# Patient Record
Sex: MALE | Race: WHITE | NOT HISPANIC OR LATINO | Employment: FULL TIME | URBAN - METROPOLITAN AREA
[De-identification: names, ages, dates, MRNs, and addresses within clinical notes are randomized per-mention and may not be internally consistent; named-entity substitution may affect disease eponyms.]

---
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
## 2020-07-13 ENCOUNTER — PATIENT MESSAGE (OUTPATIENT)
Dept: MEDICAL GROUP | Facility: PHYSICIAN GROUP | Age: 20
End: 2020-07-13

## 2020-07-13 ENCOUNTER — HOSPITAL ENCOUNTER (OUTPATIENT)
Dept: LAB | Facility: MEDICAL CENTER | Age: 20
End: 2020-07-13
Attending: FAMILY MEDICINE
Payer: COMMERCIAL

## 2020-07-13 DIAGNOSIS — Z01.84 IMMUNITY STATUS TESTING: ICD-10-CM

## 2020-07-13 LAB — SARS-COV-2 AB SERPL QL IA: NORMAL

## 2020-07-13 PROCEDURE — 86769 SARS-COV-2 COVID-19 ANTIBODY: CPT

## 2020-07-13 PROCEDURE — 36415 COLL VENOUS BLD VENIPUNCTURE: CPT

## 2020-07-21 DIAGNOSIS — F34.1 DYSTHYMIA: ICD-10-CM

## 2020-08-24 ENCOUNTER — TELEMEDICINE (OUTPATIENT)
Dept: BEHAVIORAL HEALTH | Facility: CLINIC | Age: 20
End: 2020-08-24
Payer: COMMERCIAL

## 2020-08-24 DIAGNOSIS — F43.23 ADJUSTMENT DISORDER WITH MIXED ANXIETY AND DEPRESSED MOOD: ICD-10-CM

## 2020-08-24 PROCEDURE — 90791 PSYCH DIAGNOSTIC EVALUATION: CPT | Performed by: MARRIAGE & FAMILY THERAPIST
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
## 2020-09-15 ENCOUNTER — TELEMEDICINE (OUTPATIENT)
Dept: BEHAVIORAL HEALTH | Facility: CLINIC | Age: 20
End: 2020-09-15
Payer: COMMERCIAL

## 2020-09-15 DIAGNOSIS — E66.9 OBESITY (BMI 35.0-39.9 WITHOUT COMORBIDITY): ICD-10-CM

## 2020-09-15 DIAGNOSIS — F43.23 ADJUSTMENT DISORDER WITH MIXED ANXIETY AND DEPRESSED MOOD: ICD-10-CM

## 2020-09-15 PROCEDURE — 90834 PSYTX W PT 45 MINUTES: CPT | Mod: 95,CR | Performed by: MARRIAGE & FAMILY THERAPIST

## 2020-09-15 ASSESSMENT — ANXIETY QUESTIONNAIRES
4. TROUBLE RELAXING: MORE THAN HALF THE DAYS
6. BECOMING EASILY ANNOYED OR IRRITABLE: MORE THAN HALF THE DAYS
7. FEELING AFRAID AS IF SOMETHING AWFUL MIGHT HAPPEN: SEVERAL DAYS
1. FEELING NERVOUS, ANXIOUS, OR ON EDGE: SEVERAL DAYS
3. WORRYING TOO MUCH ABOUT DIFFERENT THINGS: NOT AT ALL
5. BEING SO RESTLESS THAT IT IS HARD TO SIT STILL: MORE THAN HALF THE DAYS
2. NOT BEING ABLE TO STOP OR CONTROL WORRYING: NOT AT ALL
GAD7 TOTAL SCORE: 8

## 2020-09-15 ASSESSMENT — PATIENT HEALTH QUESTIONNAIRE - PHQ9
CLINICAL INTERPRETATION OF PHQ2 SCORE: 3
5. POOR APPETITE OR OVEREATING: 0 - NOT AT ALL
SUM OF ALL RESPONSES TO PHQ QUESTIONS 1-9: 11

## 2020-11-16 ENCOUNTER — TELEMEDICINE (OUTPATIENT)
Dept: MEDICAL GROUP | Facility: PHYSICIAN GROUP | Age: 20
End: 2020-11-16
Payer: COMMERCIAL

## 2020-11-16 DIAGNOSIS — U07.1 CLINICAL DIAGNOSIS OF COVID-19: ICD-10-CM

## 2020-11-16 PROCEDURE — 99214 OFFICE O/P EST MOD 30 MIN: CPT | Mod: 95,CR | Performed by: NURSE PRACTITIONER

## 2020-11-16 RX ORDER — ALBUTEROL SULFATE 90 UG/1
2 AEROSOL, METERED RESPIRATORY (INHALATION) EVERY 4 HOURS PRN
Qty: 1 EACH | Refills: 0 | Status: SHIPPED | OUTPATIENT
Start: 2020-11-16 | End: 2022-06-15

## 2020-11-16 RX ORDER — METHYLPREDNISOLONE 4 MG/1
TABLET ORAL
Qty: 21 TAB | Refills: 0 | Status: SHIPPED | OUTPATIENT
Start: 2020-11-16 | End: 2022-06-15

## 2020-11-16 NOTE — ASSESSMENT & PLAN NOTE
Robin reports symptoms started 10/31, states that he woke up with a pounding headache and a fever of 102.  Last fever was on 11/6.  Dates that 11/3 he started with a cough and severe shortness of breath states that he continues to have difficulty breathing but did not feel that his O2 saturation was low states he was worried he was going to have to go to the hospital but used herbal remedies to keep his lungs feeling open as well as steam.  States that he was able to stay home.  States that he continued to have some difficulty breathing but that overall it improved.  Despite significant exposure patient did have a negative Covid test.  As such he return to work 11/13.  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  The shortness of breath involved pain with inhalation and exhalation states that it is sharp and felt like a weight on his chest that it does not happen except when he is breathing in and out.  Has not noticed any chest pain, numbness or down his arm.  Nuys any epigastric discomfort.  States that after he went home he started feeling better states that a couple days after that he attempted to go jogging states that any exertion causes pain in his lungs and shortness of breath states that it will resolve if he sits and rests for approximately an hour.  States that he will feel like he is wheezing.  Patient is concerned considering his Covid test was negative but that he is having significant symptoms that remain.  Patient does have a history of pneumonia, denies any other symptoms of reactive airway disease or asthma in the past.

## 2020-11-17 NOTE — PROGRESS NOTES
Virtual Visit: Established Patient   This visit was conducted via Zoom using secure and encrypted videoconferencing technology. The patient was in a private location in the state of [place].    The patient's identity was confirmed and verbal consent was obtained for this virtual visit.    Subjective:   CC:   Chief Complaint   Patient presents with   • Letter for School/Work   • Coronavirus Screening       Robin Houser is a 20 y.o. male presenting for evaluation and management of:    Clinical diagnosis of COVID-19  Robin reports symptoms started 10/31, states that he woke up with a pounding headache and a fever of 102.  Last fever was on 11/6.  Dates that 11/3 he started with a cough and severe shortness of breath states that he continues to have difficulty breathing but did not feel that his O2 saturation was low states he was worried he was going to have to go to the hospital but used herbal remedies to keep his lungs feeling open as well as steam.  States that he was able to stay home.  States that he continued to have some difficulty breathing but that overall it improved.  Despite significant exposure patient did have a negative Covid test.  As such he return to work 11/13.  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  The shortness of breath involved pain with inhalation and exhalation states that it is sharp and felt like a weight on his chest that it does not happen except when he is breathing in and out.  Has not noticed any chest pain, numbness or down his arm.  Nuys any epigastric discomfort.  States that after he went home he started feeling better states that a couple days after that he attempted to go jogging states that any exertion causes pain in his lungs and shortness of breath states that it will resolve if he sits and rests for approximately an hour.  States that he will feel like he  is wheezing.  Patient is concerned considering his Covid test was negative but that he is having significant symptoms that remain.  Patient does have a history of pneumonia, denies any other symptoms of reactive airway disease or asthma in the past.        ROS Denies any recent fevers or chills. No nausea or vomiting. No chest pains or shortness of breath.     No Known Allergies    Current medicines (including changes today)  Current Outpatient Medications   Medication Sig Dispense Refill   • methylPREDNISolone (MEDROL DOSEPAK) 4 MG Tablet Therapy Pack As directed on the packaging label. 21 Tab 0   • albuterol 108 (90 Base) MCG/ACT Aero Soln inhalation aerosol Inhale 2 Puffs every four hours as needed for Shortness of Breath. 1 Each 0   • ibuprofen (MOTRIN) 200 MG Tab Take 200 mg by mouth every 6 hours as needed.       No current facility-administered medications for this visit.        Patient Active Problem List    Diagnosis Date Noted   • Clinical diagnosis of COVID-19 11/16/2020   • Adjustment disorder with mixed anxiety and depressed mood 08/24/2020   • Obesity (BMI 35.0-39.9 without comorbidity) 09/12/2017   • Fever 09/12/2017   • Tonsillitis 09/12/2017   • Pneumonia of both lower lobes due to infectious organism 06/19/2017   • Seasonal allergic rhinitis due to pollen 04/04/2017       Family History   Problem Relation Age of Onset   • Hypertension Father    • Lung Disease Father         asthma   • Hyperlipidemia Mother    • Diabetes Neg Hx    • Genetic Disorder Neg Hx    • Genitourinary () Problems Neg Hx    • Heart Disease Neg Hx    • Non-contributory Neg Hx        He  has a past medical history of Allergy. He also has no past medical history of ASTHMA.  He  has no past surgical history on file.       Objective:   There were no vitals taken for this visit.    Physical Exam:  Constitutional: Alert, no distress, well-groomed.  Skin: No rashes in visible areas.  Eye: Round. Conjunctiva clear, lids normal. No  icterus.   ENMT: Lips pink without lesions, good dentition, moist mucous membranes. Phonation normal.  Neck: No masses, no thyromegaly. Moves freely without pain.  Respiratory: Unlabored respiratory effort, no cough or audible wheeze  Psych: Alert and oriented x3, normal affect and mood.       Assessment and Plan:   The following treatment plan was discussed:     1. Clinical diagnosis of COVID-19  - methylPREDNISolone (MEDROL DOSEPAK) 4 MG Tablet Therapy Pack; As directed on the packaging label.  Dispense: 21 Tab; Refill: 0  - albuterol 108 (90 Base) MCG/ACT Aero Soln inhalation aerosol; Inhale 2 Puffs every four hours as needed for Shortness of Breath.  Dispense: 1 Each; Refill: 0    With regard to significant remaining shortness of breath, tightness in his chest plan to treat with steroid and albuterol inhaler.  Discussed with patient possible need for chest x-ray if symptoms do not improve with treatment patient may also need a pulmonology follow-up for diagnosis of potential asthma/reactive airway disease.  Some concern related to history of pneumonia.    Follow-up: Return in about 2 weeks (around 11/30/2020), or if symptoms worsen or fail to improve.

## 2020-11-23 ENCOUNTER — PATIENT MESSAGE (OUTPATIENT)
Dept: MEDICAL GROUP | Facility: PHYSICIAN GROUP | Age: 20
End: 2020-11-23

## 2020-11-23 DIAGNOSIS — R06.02 SOB (SHORTNESS OF BREATH): ICD-10-CM

## 2020-12-16 ENCOUNTER — HOSPITAL ENCOUNTER (OUTPATIENT)
Dept: RADIOLOGY | Facility: MEDICAL CENTER | Age: 20
End: 2020-12-16
Attending: INTERNAL MEDICINE
Payer: COMMERCIAL

## 2020-12-16 ENCOUNTER — OFFICE VISIT (OUTPATIENT)
Dept: SLEEP MEDICINE | Facility: MEDICAL CENTER | Age: 20
End: 2020-12-16
Payer: COMMERCIAL

## 2020-12-16 VITALS
OXYGEN SATURATION: 97 % | HEART RATE: 82 BPM | WEIGHT: 278 LBS | SYSTOLIC BLOOD PRESSURE: 116 MMHG | HEIGHT: 71 IN | TEMPERATURE: 98.6 F | DIASTOLIC BLOOD PRESSURE: 76 MMHG | RESPIRATION RATE: 18 BRPM | BODY MASS INDEX: 38.92 KG/M2

## 2020-12-16 DIAGNOSIS — U07.1 CLINICAL DIAGNOSIS OF COVID-19: ICD-10-CM

## 2020-12-16 DIAGNOSIS — R06.02 SOB (SHORTNESS OF BREATH): ICD-10-CM

## 2020-12-16 PROCEDURE — 71046 X-RAY EXAM CHEST 2 VIEWS: CPT

## 2020-12-16 PROCEDURE — 99204 OFFICE O/P NEW MOD 45 MIN: CPT | Performed by: INTERNAL MEDICINE

## 2020-12-16 ASSESSMENT — ENCOUNTER SYMPTOMS
DIAPHORESIS: 0
SPEECH CHANGE: 0
HEARTBURN: 0
CONSTIPATION: 0
PALPITATIONS: 0
ABDOMINAL PAIN: 0
DIZZINESS: 0
VOMITING: 0
COUGH: 0
FOCAL WEAKNESS: 0
DIARRHEA: 0
DEPRESSION: 0
EYE DISCHARGE: 0
PHOTOPHOBIA: 0
FALLS: 0
WHEEZING: 0
EYE REDNESS: 0
HEMOPTYSIS: 0
CHILLS: 0
SHORTNESS OF BREATH: 1
CLAUDICATION: 0
NAUSEA: 0
EYE PAIN: 0
MYALGIAS: 0
HEADACHES: 0
BLURRED VISION: 0
WEAKNESS: 0
PND: 0
SORE THROAT: 0
ORTHOPNEA: 0
SPUTUM PRODUCTION: 0
FEVER: 0
STRIDOR: 0
SINUS PAIN: 0
BACK PAIN: 0
WEIGHT LOSS: 0
DOUBLE VISION: 0
TREMORS: 0
NECK PAIN: 0

## 2020-12-16 NOTE — PROGRESS NOTES
Chief Complaint   Patient presents with   • Establish Care     referral 11/16/2020 apryl Barnard DX SOB    • Results     CXR 6/17/17        HPI: This patient is a 20 y.o. male presenting for evaluation of SOB and chest pain.  The patient's past medical history is significant only for seasonal allergies for which she takes as needed, over-the-counter antihistamine.  No surgical history.  He is a lifelong non-smoker.  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  Family history is notable for father with asthma and multiple family members with environmental allergies.  The patient presents today for evaluation of chest tightness that persisted after a respiratory tract infection presumed secondary to novel coronavirus COVID-19 although he tested negative on November 6 with [facility].  Patient reports fairly severe respiratory symptoms dating back to January of this year when he experienced fevers, body aches, shortness of breath, cough, diarrhea and associated nausea and vomiting.  Acute symptoms lasted for roughly 10 days and overall he improved in a period of roughly 3 weeks.  In July he had coronavirus antibody testing done which was negative.  On October 31 he had sudden onset severe headache with fever to 102.  This was associated with diarrhea, chest pressure and later dry cough that was less severe than his infection in January.  As per above he was tested for coronavirus on November 6 which was negative.  Acute symptoms lasted for roughly 10 days but overall he continued to improve over the next 2 weeks.  Since that time he has continued to have chest tightness particularly when lying flat at night or with activity during the day.  He was treated by his primary care with Medrol Dosepak and albuterol inhaler.  He did not notice any difference while on steroids but does notice some improvement in symptoms with albuterol.  He denies wheezing or cough.  No recurrent fevers.  Last  chest x-ray is from June 2017 which showed some bilateral interstitial changes that were mild.  Patient was reportedly symptomatic and treated for pneumonia at that time.    Past Medical History:   Diagnosis Date   • Allergy     dust, perfume, pollen       Social History     Socioeconomic History   • Marital status: Single     Spouse name: Not on file   • Number of children: Not on file   • Years of education: Not on file   • Highest education level: Not on file   Occupational History   • Not on file   Social Needs   • Financial resource strain: Not on file   • Food insecurity     Worry: Not on file     Inability: Not on file   • Transportation needs     Medical: Not on file     Non-medical: Not on file   Tobacco Use   • Smoking status: Never Smoker   • Smokeless tobacco: Never Used   Substance and Sexual Activity   • Alcohol use: No   • Drug use: No   • Sexual activity: Never     Comment: 7th grade - [employer]   Lifestyle   • Physical activity     Days per week: Not on file     Minutes per session: Not on file   • Stress: Not on file   Relationships   • Social connections     Talks on phone: Not on file     Gets together: Not on file     Attends [employer] service: Not on file     Active member of club or organization: Not on file     Attends meetings of clubs or organizations: Not on file     Relationship status: Not on file   • Intimate partner violence     Fear of current or ex partner: Not on file     Emotionally abused: Not on file     Physically abused: Not on file     Forced sexual activity: Not on file   Other Topics Concern   • Behavioral problems No   • Interpersonal relationships Yes   • Sad or not enjoying activities No   • Suicidal thoughts No   • Poor school performance Not Asked   • Reading difficulties No   • Speech difficulties No   • Writing difficulties No   • Inadequate sleep No   • Excessive TV viewing No   • Excessive video game use Yes   • Inadequate exercise Yes   • Sports related No   •  Poor diet No   • Family concerns for drug/alcohol abuse No   • Poor oral hygiene No   • Bike safety Yes   • Family concerns vehicle safety No   Social History Narrative   • Not on file       Family History   Problem Relation Age of Onset   • Hypertension Father    • Lung Disease Father         asthma   • Hyperlipidemia Mother    • Diabetes Neg Hx    • Genetic Disorder Neg Hx    • Genitourinary () Problems Neg Hx    • Heart Disease Neg Hx    • Non-contributory Neg Hx        Current Outpatient Medications on File Prior to Visit   Medication Sig Dispense Refill   • albuterol 108 (90 Base) MCG/ACT Aero Soln inhalation aerosol Inhale 2 Puffs every four hours as needed for Shortness of Breath. 1 Each 0   • methylPREDNISolone (MEDROL DOSEPAK) 4 MG Tablet Therapy Pack As directed on the packaging label. (Patient not taking: Reported on 12/16/2020) 21 Tab 0   • ibuprofen (MOTRIN) 200 MG Tab Take 200 mg by mouth every 6 hours as needed.       No current facility-administered medications on file prior to visit.        Allergies: Patient has no known allergies.    ROS:   Review of Systems   Constitutional: Negative for chills, diaphoresis, fever, malaise/fatigue and weight loss.   HENT: Negative for congestion, ear discharge, ear pain, hearing loss, nosebleeds, sinus pain, sore throat and tinnitus.    Eyes: Negative for blurred vision, double vision, photophobia, pain, discharge and redness.   Respiratory: Positive for shortness of breath. Negative for cough, hemoptysis, sputum production, wheezing and stridor.    Cardiovascular: Positive for chest pain. Negative for palpitations, orthopnea, claudication, leg swelling and PND.   Gastrointestinal: Negative for abdominal pain, constipation, diarrhea, heartburn, nausea and vomiting.   Genitourinary: Negative for dysuria and urgency.   Musculoskeletal: Negative for back pain, falls, joint pain, myalgias and neck pain.   Skin: Negative for itching and rash.   Neurological:  "Negative for dizziness, tremors, speech change, focal weakness, weakness and headaches.   Endo/Heme/Allergies: Negative for environmental allergies.   Psychiatric/Behavioral: Negative for depression.       /76 (BP Location: Right arm, Patient Position: Sitting, BP Cuff Size: Large adult)   Pulse 82   Temp 37 °C (98.6 °F) (Temporal)   Resp 18   Ht 1.803 m (5' 11\")   Wt (!) 126.1 kg (278 lb)   SpO2 97%     Physical Exam:  Physical Exam  Vitals signs reviewed.   Constitutional:       General: He is not in acute distress.     Appearance: Normal appearance. He is obese.   HENT:      Head: Normocephalic and atraumatic.      Right Ear: External ear normal.      Left Ear: External ear normal.      Nose: Nose normal. No congestion.      Mouth/Throat:      Mouth: Mucous membranes are moist.      Pharynx: Oropharynx is clear. No oropharyngeal exudate.   Eyes:      General: No scleral icterus.     Extraocular Movements: Extraocular movements intact.      Conjunctiva/sclera: Conjunctivae normal.      Pupils: Pupils are equal, round, and reactive to light.   Neck:      Musculoskeletal: Normal range of motion and neck supple.   Cardiovascular:      Rate and Rhythm: Normal rate and regular rhythm.      Heart sounds: Normal heart sounds. No murmur. No gallop.    Pulmonary:      Effort: Pulmonary effort is normal. No respiratory distress.      Breath sounds: Normal breath sounds. No wheezing or rales.   Abdominal:      General: Abdomen is flat. There is no distension.      Palpations: Abdomen is soft.   Musculoskeletal: Normal range of motion.      Right lower leg: No edema.      Left lower leg: No edema.   Skin:     General: Skin is warm and dry.      Findings: No rash.   Neurological:      Mental Status: He is alert and oriented to person, place, and time.      Cranial Nerves: No cranial nerve deficit.   Psychiatric:         Mood and Affect: Mood normal.         Behavior: Behavior normal.         PFTs as reviewed by me " personally:pending    Imaging as reviewed by me personally:pending    Assessment:  1. SOB (shortness of breath)  PULMONARY FUNCTION TESTS -Test requested: Complete Pulmonary Function Test    DX-CHEST-2 VIEWS   2. Clinical diagnosis of COVID-19         Plan:  1.  This is a persistent symptom following respiratory tract infection presumed secondary to coronavirus although patient tested negative, this could be false negative as he did have close contact to others who did test positive prior to symptom onset.  We discussed the possibility of postinfectious reactive airways disease given chest tightness and improvement in symptoms following rescue bronchodilator.  I am recommending we proceed with a trial of inhaled corticosteroid and long-acting beta agonist and he was given Dulera 100 in clinic today.  He will contact me if no improvement in symptoms.  If symptoms do improve we can determine whether or not to continue or see how he does after 2 weeks.  We will continue short acting bronchodilators as needed and he will be provided a spacer and instructed on use.  We will also obtain chest x-ray and pulmonary function test.  2.  This was clinically suspected but not confirmed based on testing.  Certainly timing and presentation are consistent with what we would expect in Covid positive patient.  For now we will just treat him as though he did test positive.  As per above biggest concern is postinfectious reactive airways disease.  If he does not improve with treatment for this and no abnormalities on PFTs we may need to proceed with echocardiogram.  Return in about 2 months (around 2/16/2021), or 2-3 months, for pfts, cxr.       No

## 2021-01-13 ENCOUNTER — NON-PROVIDER VISIT (OUTPATIENT)
Dept: SLEEP MEDICINE | Facility: MEDICAL CENTER | Age: 21
End: 2021-01-13
Attending: INTERNAL MEDICINE
Payer: COMMERCIAL

## 2021-01-13 VITALS — BODY MASS INDEX: 38.08 KG/M2 | WEIGHT: 266 LBS | HEIGHT: 70 IN

## 2021-01-13 DIAGNOSIS — R06.02 SOB (SHORTNESS OF BREATH): ICD-10-CM

## 2021-01-13 PROCEDURE — 94729 DIFFUSING CAPACITY: CPT | Performed by: INTERNAL MEDICINE

## 2021-01-13 PROCEDURE — 94726 PLETHYSMOGRAPHY LUNG VOLUMES: CPT | Performed by: INTERNAL MEDICINE

## 2021-01-13 PROCEDURE — 94060 EVALUATION OF WHEEZING: CPT | Performed by: INTERNAL MEDICINE

## 2021-01-13 ASSESSMENT — PULMONARY FUNCTION TESTS
FEV1_LLN: 3.90
FEV1_PERCENT_PREDICTED: 103
FEV1_PERCENT_CHANGE: 0
FEV1/FVC: 93
FVC_PERCENT_PREDICTED: 89
FEV1_PERCENT_CHANGE: 5
FEV1/FVC_PERCENT_PREDICTED: 113
FVC_PERCENT_PREDICTED: 90
FEV1/FVC_PERCENT_CHANGE: 4
FEV1/FVC: 96.79
FEV1/FVC: 97
FEV1/FVC: 93
FVC_PREDICTED: 5.52
FVC: 4.98
FEV1/FVC_PERCENT_PREDICTED: 85
FEV1/FVC_PERCENT_PREDICTED: 114
FEV1/FVC_PREDICTED: 85
FVC_LLN: 4.61
FEV1_PERCENT_PREDICTED: 97
FEV1: 4.82
FEV1_PREDICTED: 4.67
FEV1/FVC_PERCENT_LLN: 71
FEV1/FVC_PERCENT_PREDICTED: 109
FVC: 4.94
FEV1/FVC_PERCENT_PREDICTED: 108
FEV1: 4.57

## 2021-01-14 NOTE — PROCEDURES
[facility] [place]  PROGRESS NOTE  Chief Complaint:   Patient presents with:  ER F/U: mild dehydration follow up      HPI:   This is a 46year old male presents for follow-up from ER after patient was diagnosed with dehydration.   Patient's fluid Tech: Georgina Sosa, RRT, CPFT  Tech notes: Good patient effort & cooperation.  During inhale maneuver feel tightness in chest with dull pain.  The results of this test meet the ATS/ERS standards for acceptability & reproducibility.  Test was performed on the [employer] Body Plethysmograph-Elite DX system.  Predicted values were GLI-2012 for spirometry, GLI- 2017 for DLCO, ITS for Lung Volumes.  The DLCO was uncorrected for Hgb.  A bronchodilator of Ventolin HFA -2puffs via spacer administered.  DLCO performed during dilation period.    Interpretation:  1.  Baseline spirometry shows mild airflow restriction with reduced FVC compared to FEV1 with ratio of 93 however FVC is within normal limits at 4.94 L or 89% predicted.   2.  There is significant bronchodilator response.  3.  Total lung capacity is within normal limits at 6.47 L or 94% predicted.  Significant air trapping.  4.  Diffusion capacity is elevated at 134% predicted.  Pulmonary function testing is overall normal however there is reduced FVC when compared to FEV1 suggesting mild restrictive process which may be related to body habitus.  Bronchodilator responsiveness may be consistent with reactive airways disease.  Suggest clinical correlation.  2.   Hypertension Brother      Allergies:    Amlodipine                  Comment:Other reaction(s): ankle swelling  Ace Inhibitors          Coughing    Comment:cough  Current Meds:    Current Outpatient Prescriptions:  Metoprolol Tartrate 50 MG Oral Tab Take 1 Answered         REVIEW OF SYSTEMS:   CONSTITUTIONAL:  Denies unusual weight gain/loss, fever, chills, or fatigue.    EYES: no visual complaints or deficits  HEENT: denies nasal congestion, sinus pain or sore throat; hearing loss negative  INTEGUMENTARY:  D alert and oriented x 3; affect appropriate          ASSESSMENT AND PLAN:   Mendoza Merritt was seen today for er f/u. Diagnoses and all orders for this visit:    Dehydration-improved      Patient Instructions   Recommend plenty of fluids.    Dehydration improved

## 2021-02-17 ENCOUNTER — APPOINTMENT (OUTPATIENT)
Dept: SLEEP MEDICINE | Facility: MEDICAL CENTER | Age: 21
End: 2021-02-17
Payer: COMMERCIAL

## 2021-03-25 ENCOUNTER — TELEMEDICINE (OUTPATIENT)
Dept: SLEEP MEDICINE | Facility: MEDICAL CENTER | Age: 21
End: 2021-03-25
Payer: COMMERCIAL

## 2021-03-25 DIAGNOSIS — R06.02 SOB (SHORTNESS OF BREATH): ICD-10-CM

## 2021-03-25 DIAGNOSIS — U07.1 CLINICAL DIAGNOSIS OF COVID-19: ICD-10-CM

## 2021-03-25 PROCEDURE — 99213 OFFICE O/P EST LOW 20 MIN: CPT | Mod: 95,CR | Performed by: INTERNAL MEDICINE

## 2021-03-25 ASSESSMENT — ENCOUNTER SYMPTOMS
PND: 0
COUGH: 0
FALLS: 0
HEARTBURN: 0
PHOTOPHOBIA: 0
CHILLS: 0
WEAKNESS: 0
WHEEZING: 0
DIAPHORESIS: 0
SORE THROAT: 0
FEVER: 0
MYALGIAS: 0
CLAUDICATION: 0
SHORTNESS OF BREATH: 0
TREMORS: 0
NECK PAIN: 0
SINUS PAIN: 0
FOCAL WEAKNESS: 0
SPUTUM PRODUCTION: 0
STRIDOR: 0
DIARRHEA: 0
DIZZINESS: 0
NAUSEA: 0
EYE DISCHARGE: 0
VOMITING: 0
SPEECH CHANGE: 0
WEIGHT LOSS: 0
EYE PAIN: 0
DOUBLE VISION: 0
DEPRESSION: 0
ABDOMINAL PAIN: 0
EYE REDNESS: 0
BLURRED VISION: 0
BACK PAIN: 0
HEADACHES: 0
HEMOPTYSIS: 0
CONSTIPATION: 0
PALPITATIONS: 0
ORTHOPNEA: 0

## 2021-03-25 NOTE — PROGRESS NOTES
Chief Complaint   Patient presents with   • Shortness of Breath     last seen 12/16/2020     This evaluation was conducted via Zoom using secure and encrypted videoconferencing technology. The patient was in a private location in the state [facility].    The patient's identity was confirmed and verbal consent was obtained for this virtual visit.    HPI: This patient is a 20 y.o. male whom is followed in our clinic for shortness of breath last seen by me on 12/16/2020.The patient's past medical history is significant only for seasonal allergies for which he takes as needed, over-the-counter antihistamine.  He is a lifelong non-smoker.  The patient presented to me in December for chest tightness and shortness of breath with cough that persisted following a presumptive diagnosis of coronavirus 19 November.  He actually tested negative but had a known exposure and acute symptoms included fever, body ache, shortness of breath, cough, diarrhea with associated nausea and vomiting.  Acute symptoms resolved after about 10 days to 2 weeks but he continued to have chest tightness particularly lying flat or with activity during the day.  He was given an albuterol inhaler and Medrol Dosepak by primary care provider with temporary relief.  Ordered chest x-ray which showed clear lung fields and pulmonary function testing which showed no airflow obstruction but did show bronchodilator responsiveness and air trapping, normal total lung capacity and elevated DLCO all suggestive of reactive airways disease.  We gave him a 2-week trial of Dulera 100 which improved his symptoms immediately and he had no need for rescue inhaler while on this therapy.  He spaced the Dulera out taking only 1 puff twice daily and has officially been off now for 6 days with no worsening of symptoms.    Past Medical History:   Diagnosis Date   • Allergy     dust, perfume, pollen       Social History     Socioeconomic History   • Marital status: Single      Spouse name: Not on file   • Number of children: Not on file   • Years of education: Not on file   • Highest education level: Not on file   Occupational History   • Not on file   Tobacco Use   • Smoking status: Never Smoker   • Smokeless tobacco: Never Used   Substance and Sexual Activity   • Alcohol use: No   • Drug use: No   • Sexual activity: Never     Comment: 7th grade - [employer]   Other Topics Concern   • Behavioral problems No   • Interpersonal relationships Yes   • Sad or not enjoying activities No   • Suicidal thoughts No   • Poor school performance Not Asked   • Reading difficulties No   • Speech difficulties No   • Writing difficulties No   • Inadequate sleep No   • Excessive TV viewing No   • Excessive video game use Yes   • Inadequate exercise Yes   • Sports related No   • Poor diet No   • Family concerns for drug/alcohol abuse No   • Poor oral hygiene No   • Bike safety Yes   • Family concerns vehicle safety No   Social History Narrative   • Not on file     Social Determinants of Health     Financial Resource Strain:    • Difficulty of Paying Living Expenses:    Food Insecurity:    • Worried About Running Out of Food in the Last Year:    • Ran Out of Food in the Last Year:    Transportation Needs:    • Lack of Transportation (Medical):    • Lack of Transportation (Non-Medical):    Physical Activity:    • Days of Exercise per Week:    • Minutes of Exercise per Session:    Stress:    • Feeling of Stress :    Social Connections:    • Frequency of Communication with Friends and Family:    • Frequency of Social Gatherings with Friends and Family:    • Attends [employer] Services:    • Active Member of Clubs or Organizations:    • Attends Club or Organization Meetings:    • Marital Status:    Intimate Partner Violence:    • Fear of Current or Ex-Partner:    • Emotionally Abused:    • Physically Abused:    • Sexually Abused:        Family History   Problem Relation Age of Onset   • Hypertension Father    •  Lung Disease Father         asthma   • Hyperlipidemia Mother    • Diabetes Neg Hx    • Genetic Disorder Neg Hx    • Genitourinary () Problems Neg Hx    • Heart Disease Neg Hx    • Non-contributory Neg Hx        Current Outpatient Medications on File Prior to Visit   Medication Sig Dispense Refill   • Mometasone Furo-Formoterol Fum (DULERA) 100-5 MCG/ACT Aerosol Inhale 2 Puffs 2 Times a Day. Use spacer. Rinse mouth after use. 1 Each 0   • albuterol 108 (90 Base) MCG/ACT Aero Soln inhalation aerosol Inhale 2 Puffs every four hours as needed for Shortness of Breath. 1 Each 0   • ibuprofen (MOTRIN) 200 MG Tab Take 200 mg by mouth every 6 hours as needed.     • methylPREDNISolone (MEDROL DOSEPAK) 4 MG Tablet Therapy Pack As directed on the packaging label. (Patient not taking: Reported on 12/16/2020) 21 Tab 0     No current facility-administered medications on file prior to visit.       Patient has no known allergies.      ROS:   Review of Systems   Constitutional: Negative for chills, diaphoresis, fever, malaise/fatigue and weight loss.   HENT: Negative for congestion, ear discharge, ear pain, hearing loss, nosebleeds, sinus pain, sore throat and tinnitus.    Eyes: Negative for blurred vision, double vision, photophobia, pain, discharge and redness.   Respiratory: Negative for cough, hemoptysis, sputum production, shortness of breath, wheezing and stridor.    Cardiovascular: Negative for chest pain, palpitations, orthopnea, claudication, leg swelling and PND.   Gastrointestinal: Negative for abdominal pain, constipation, diarrhea, heartburn, nausea and vomiting.   Genitourinary: Negative for dysuria and urgency.   Musculoskeletal: Negative for back pain, falls, joint pain, myalgias and neck pain.   Skin: Negative for itching and rash.   Neurological: Negative for dizziness, tremors, speech change, focal weakness, weakness and headaches.   Endo/Heme/Allergies: Negative for environmental allergies.    Psychiatric/Behavioral: Negative for depression.       There were no vitals taken for this visit.  Physical Exam  Constitutional:       General: He is not in acute distress.     Appearance: Normal appearance. He is normal weight.   HENT:      Head: Normocephalic and atraumatic.      Right Ear: External ear normal.      Left Ear: External ear normal.      Nose: Nose normal. No congestion.      Mouth/Throat:      Mouth: Mucous membranes are moist.      Pharynx: Oropharynx is clear. No oropharyngeal exudate.   Eyes:      General: No scleral icterus.     Extraocular Movements: Extraocular movements intact.      Conjunctiva/sclera: Conjunctivae normal.      Pupils: Pupils are equal, round, and reactive to light.   Neurological:      Mental Status: He is alert and oriented to person, place, and time.   Psychiatric:         Mood and Affect: Mood normal.         Behavior: Behavior normal.         PFTs as reviewed by me personally: as per hPI    Imaging as reviewed by me personally:  As per hPI    Assessment:  1. SOB (shortness of breath)     2. Clinical diagnosis of COVID-19         Plan:  1.  This was presumed secondary to postinfectious reactive airways disease for which we treated the patient with inhaled corticosteroids at low-dose with complete resolution of symptoms.  Given we suspect his symptoms are temporary, we discussed either continuing Dulera or trying a trial off therapy to see if symptoms worsen.  He is currently been on for 6 days with no recurrence or worsening of symptoms.  Patient will continue to use albuterol as needed and if symptoms worsen we can resume Dulera.  At this time he will follow-up as needed pending response to being off therapy.  2.  Clinically presumed although now definitive diagnosis.  Continue observation and best supportive care.  Return if symptoms worsen or fail to improve.

## 2021-04-16 ENCOUNTER — NON-PROVIDER VISIT (OUTPATIENT)
Dept: URGENT CARE | Facility: CLINIC | Age: 21
End: 2021-04-16

## 2021-04-16 DIAGNOSIS — Z02.1 PRE-EMPLOYMENT DRUG SCREENING: ICD-10-CM

## 2021-04-16 LAB
AMP AMPHETAMINE: NORMAL
COC COCAINE: NORMAL
INT CON NEG: NORMAL
INT CON POS: NORMAL
MET METHAMPHETAMINES: NORMAL
OPI OPIATES: NORMAL
PCP PHENCYCLIDINE: NORMAL
POC DRUG COMMENT 753798-OCCUPATIONAL HEALTH: NORMAL
THC: NORMAL

## 2021-04-16 PROCEDURE — 80305 DRUG TEST PRSMV DIR OPT OBS: CPT | Performed by: PHYSICIAN ASSISTANT

## 2021-05-11 ENCOUNTER — OCCUPATIONAL MEDICINE (OUTPATIENT)
Dept: URGENT CARE | Facility: PHYSICIAN GROUP | Age: 21
End: 2021-05-11
Payer: COMMERCIAL

## 2021-05-11 ENCOUNTER — HOSPITAL ENCOUNTER (OUTPATIENT)
Dept: RADIOLOGY | Facility: MEDICAL CENTER | Age: 21
End: 2021-05-11
Attending: PHYSICIAN ASSISTANT
Payer: COMMERCIAL

## 2021-05-11 VITALS
WEIGHT: 270 LBS | BODY MASS INDEX: 38.65 KG/M2 | HEART RATE: 85 BPM | OXYGEN SATURATION: 98 % | HEIGHT: 70 IN | TEMPERATURE: 98 F | SYSTOLIC BLOOD PRESSURE: 138 MMHG | DIASTOLIC BLOOD PRESSURE: 94 MMHG | RESPIRATION RATE: 15 BRPM

## 2021-05-11 DIAGNOSIS — S60.221A CONTUSION OF RIGHT HAND, INITIAL ENCOUNTER: ICD-10-CM

## 2021-05-11 DIAGNOSIS — S62.396A CLOSED DISPLACED FRACTURE OF OTHER PART OF FIFTH METACARPAL BONE OF RIGHT HAND, INITIAL ENCOUNTER: ICD-10-CM

## 2021-05-11 DIAGNOSIS — Z02.1 PRE-EMPLOYMENT DRUG SCREENING: ICD-10-CM

## 2021-05-11 PROCEDURE — 73130 X-RAY EXAM OF HAND: CPT | Mod: RT

## 2021-05-11 PROCEDURE — 99214 OFFICE O/P EST MOD 30 MIN: CPT | Mod: 29 | Performed by: PHYSICIAN ASSISTANT

## 2021-05-11 PROCEDURE — 80305 DRUG TEST PRSMV DIR OPT OBS: CPT | Mod: 29 | Performed by: PHYSICIAN ASSISTANT

## 2021-05-11 ASSESSMENT — PAIN SCALES - GENERAL: PAINLEVEL: 7=MODERATE-SEVERE PAIN

## 2021-05-11 ASSESSMENT — ENCOUNTER SYMPTOMS
MYALGIAS: 1
TINGLING: 0
WEAKNESS: 0

## 2021-05-11 NOTE — LETTER
[facility] [place]  [address] [facility] [place] [address]  Phone:  [phone] - Fax:  [phone]   [facility] Progress Report and Disability Certification  Date of Service: 5/11/2021   No Show:  No  Date / Time of Next Visit: 5/17/2021   Claim Information   Patient Name: Robin Houser  Claim Number:     Employer: [employer]  Date of Injury: 5/11/2021     Insurer / TPA: [employer]  ID / SSN:     Occupation:   Diagnosis: Diagnoses of Contusion of right hand, initial encounter and Closed displaced fracture of other part of fifth metacarpal bone of right hand, initial encounter were pertinent to this visit.    Medical Information   Related to Industrial Injury? Yes    Subjective Complaints:  HPI:  DOI: 5/11/21  ERASTO:  This is a very pleasant 20-year-old male presenting to the clinic after sustaining a work-related injury.  The patient works at [employer].  Today he was moving a battery weighing approximately 40 pounds.  He was lifting a battery with his left hand with his right hand on the table.  The battery slipped out of his left hand landing on his right hand.  He experienced immediate pain and swelling predominantly located over the right hand fourth and fifth MCP joints.  The patient is right-handed.  He denies any previous injury to this hand.  Pain is described as a constant dull ache.  He has not tried any OTC medications at this time for symptoms.  The pain does not radiate.  He denies any numbness or tingling distally.  Patient does not have a second job.    PMH:   No pertinent past medical history to this problem  MEDS:  Medications were reviewed in EMR  ALLERGIES:  Allergies were reviewed in EMR  SOCHX:  Works at [employer]  FH:   No pertinent family history to this problem     Objective Findings: Constitutional: Pt is oriented to person, place, and time.  Appears well-developed and well-nourished. No distress.   Eyes: Conjunctivae  are normal.   Cardiovascular: Normal rate.    Pulmonary/Chest: Effort normal.   Musculoskeletal: Right hand: There is obvious swelling appreciated over the right hand greatest in the area of the fourth and fifth MCP joint.  She does have swelling extending to the fourth and fifth metacarpals.  Range of motion is limited to full flexion of the MCP joints due to pain.  Full and pain-free wrist range of motion.  Sensation is intact to all fingers distally.  He is tender to palpation over the fourth and fifth MCP joints and metacarpals.  Neurological: Pt is alert and oriented to person, place, and time. Coordination normal.   Skin: Skin is warm. Pt is not diaphoretic. No erythema.   Psychiatric: Pt has a normal mood and affect.  Behavior is normal.      Pre-Existing Condition(s):     Assessment:   Initial Visit    Status: Discharged / Care Transfer  Permanent Disability:No    Plan: Transfer Care  Comments:Patient has transfer to occupational medicine and sports medicine.    Diagnostics: X-ray    Comments:  FINDINGS:  There is an acute minimally anteriorly angulated fracture of the distal metaphysis of the right 5th metacarpal.  No articular communication is identified.  There is overlying soft tissue swelling.     IMPRESSION:     1.  Acute minimally an  teriorly angulated fracture of the distal metaphysis of the right 5th metacarpal.     2.  No articular communication identified.    Disability Information   Status: Released to Restricted Duty    From:  5/11/2021  Through: 5/17/2021 Restrictions are: Temporary   Physical Restrictions   Sitting:    Standing:    Stooping:    Bending:      Squatting:    Walking:    Climbing:    Pushing:      Pulling:    Other:    Reaching Above Shoulder (L):   Reaching Above Shoulder (R):       Reaching Below Shoulder (L):    Reaching Below Shoulder (R):      Not to exceed Weight Limits   Carrying(hrs):   Weight Limit(lb):   Lifting(hrs):   Weight  Limit(lb):     Comments: Patient is to  continue wearing splint on right hand until next follow-up visit.  No use of right hand while at work.  Ice and elevation recommended.  Alternate Tylenol and ibuprofen if needed for pain.  A referral was placed for the patient to follow-up with sports medicine and occupational medicine.  If possible I would like follow-up in the next 7 days.  Patient does have a minimally angulated fracture of the right fifth metacarpal.  Was placed in an ulnar gutter splint.  Encouraged to call if he has any questions or concerns return to clinic for any worsening symptoms.    Repetitive Actions   Hands: i.e. Fine Manipulations from Grasping:     Feet: i.e. Operating Foot Controls:     Driving / Operate Machinery:     Provider Name:   Michael Walton P.A.-C. Physician Signature:  Physician Name:     Clinic Name / Location: [address] [address] Clinic Phone Number: Dept: [phone]   Appointment Time: 10:20 Am Visit Start Time: 11:41 AM   Check-In Time:  10:51 Am Visit Discharge Time:  12:30 PM   Original-Treating Physician or Chiropractor    Page 2-Insurer/TPA    Page 3-Employer    Page 4-Employee

## 2021-05-11 NOTE — LETTER
"EMPLOYEE’S CLAIM FOR COMPENSATION/ REPORT OF INITIAL TREATMENT  FORM C-4    EMPLOYEE’S CLAIM - PROVIDE ALL INFORMATION REQUESTED   First Name  Robin Last Name  Mik Birthdate                    2000                Sex  male Claim Number   Home Address  [address] Age  20 y.o. Height  1.778 m (5' 10\") Weight  122 kg (270 lb) [facility]     [facility] Zip  [address] Telephone  [phone] (home)    Mailing Address  [address] [facility] Zip  [address] Primary Language Spoken  English    Insurer   Third Party   [employer]   Employee's Occupation (Job Title) When Injury or Occupational Disease Occurred      Employer's Name  [employer]  Telephone  [phone]    Employer Address  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 (Be specific an answer in detail. Use additional sheet if necessary)  Battery slipped from left hand landing and crushing right hand pinky and knucle   If you believe that you have an occupational disease, when did you first have knowledge of the disability and it relationship to your employment?  N/A Witnesses to the Accident  None      Nature of Injury or Occupational Disease  Workers' Compensation  Part(s) of Body Injured or Affected  Finger (R), Finger (R), Finger (R)    I certify that the above is true and correct to the best of my knowledge and that I have provided this information in order to obtain " the benefits of [place]  I hereby authorize any physician, chiropractor, surgeon, practitioner, or other person, any hospital, including [facility] hospital, any medical service organization, any insurance company, or other institution or organization to release to each other, any medical or other information, including benefits paid or payable, pertinent to this injury or disease, except information relative to diagnosis, treatment and/or counseling for AIDS, psychological conditions, alcohol or controlled substances, for which I must give specific authorization.  A Photostat of this authorization shall be as valid as the original.     Date   Place   Employee’s Signature   THIS REPORT MUST BE COMPLETED AND MAILED WITHIN 3 WORKING DAYS OF TREATMENT   Place  [facility]  Name of Facility  [place]   Date  5/11/2021 Diagnosis  (S60.221A) Contusion of right hand, initial encounter  (S62.396A) Closed displaced fracture of other part of fifth metacarpal bone of right hand, initial encounter Is there evidence the injured employee was under the              influence of alcohol and/or another controlled substance at the time of accident?   Hour  11:41 AM Description of Injury or Disease  Diagnoses of Contusion of right hand, initial encounter and Closed displaced fracture of other part of fifth metacarpal bone of right hand, initial encounter were pertinent to this visit. No   Treatment  Patient is to continue wearing splint on right hand until next follow-up visit.  No use of right hand while at work.  Ice and elevation recommended.  Alternate Tylenol and ibuprofen if needed for pain.  A referral was placed for the patient to follow-up with sports medicine and occupational medicine.  If possible I would like follow-up in the next 7 days.  Patient does have a minimally angulated  fracture of the right fifth metacarpal.  Was placed in an ulnar gutter splint.  Encouraged to call if he has any questions or concerns return to clinic for any worsening symptoms.  Have you advised the patient to remain off work five days or     more? No   X-Ray Findings  Positive  Comments:Fracture of fifth metacarpal minimally angulated   If Yes   From Date  To Date      From information given by the employee, together with medical evidence, can you directly connect this injury or occupational disease as job incurred?  Yes If No Full Duty    No Modified Duty  Yes   Is additional medical care by a physician indicated?  Yes  Comments:Referral placed to follow-up with sports medicine and occupational medicine If Modified Duty, Specify any Limitations / Restrictions  See D-39   Do you know of any previous injury or disease contributing to this condition or occupational disease?                            No   Date  5/11/2021 Print Doctor’s Name   Juanpablo Walton P.A.-C. I certify the employer’s copy of  this form was mailed on:   Address  [address] [place] [address]. Insurer’s Use Only     [facility]  [address]    Provider’s Tax ID Number  [id number] Telephone  Dept: [phone]      JUANPABLO Vernon P.A.-C.  Signature:     Degree          ORIGINAL-TREATING PHYSICIAN OR CHIROPRACTOR    PAGE 2-INSURER/TPA    PAGE 3-EMPLOYER    PAGE 4-EMPLOYEE        Form C-4 (rev.10/07)           BRIEF DESCRIPTION OF RIGHTS AND BENEFITS  (Pursuant to [place] 616C.050)    Notice of Injury or Occupational Disease (Incident Report Form C-1): If an injury or occupational disease (OD) arises out of and in the course of employment, you must provide written notice to your employer as soon as practicable, but no later than 7 days after the accident or OD. Your employer shall maintain a sufficient supply of the required forms.    Claim for Compensation (Form C-4): If medical treatment is sought, the form C-4 is available at the place of  "initial treatment. A completed \"Claim for Compensation\" (Form C-4) must be filed within 90 days after an accident or OD. The treating physician or chiropractor must, within 3 working days after treatment, complete and mail to the employer, the employer's insurer and third-party , the Claim for Compensation.    Medical Treatment: If you require medical treatment for your on-the-job injury or OD, you may be required to select a physician or chiropractor from a list provided by your workers’ compensation insurer, if it has contracted with an Organization for Managed Care (MCO) or Preferred Provider Organization (PPO) or providers of health care. If your employer has not entered into a contract with an MCO or PPO, you may select a physician or chiropractor from the Panel of Physicians and Chiropractors. Any medical costs related to your industrial injury or OD will be paid by your insurer.    Temporary Total Disability (TTD): If your doctor has certified that you are unable to work for a period of at least 5 consecutive days, or 5 cumulative days in a 20-day period, or places restrictions on you that your employer does not accommodate, you may be entitled to TTD compensation.    Temporary Partial Disability (TPD): If the wage you receive upon reemployment is less than the compensation for TTD to which you are entitled, the insurer may be required to pay you TPD compensation to make up the difference. TPD can only be paid for a maximum of 24 months.    Permanent Partial Disability (PPD): When your medical condition is stable and there is an indication of a PPD as a result of your injury or OD, within 30 days, your insurer must arrange for an evaluation by a rating physician or chiropractor to determine the degree of your PPD. The amount of your PPD award depends on the date of injury, the results of the PPD evaluation, your age and wage.    Permanent Total Disability (PTD): If you are medically certified by " a treating physician or chiropractor as permanently and totally disabled and have been granted a PTD status by your insurer, you are entitled to receive monthly benefits not to exceed 66 2/3% of your average monthly wage. The amount of your PTD payments is subject to reduction if you previously received a lump-sum PPD award.    Vocational Rehabilitation Services: You may be eligible for vocational rehabilitation services if you are unable to return to the job due to a permanent physical impairment or permanent restrictions as a result of your injury or occupational disease.    Transportation and Per Alicja Reimbursement: You may be eligible for travel expenses and per alicja associated with medical treatment.    Reopening: You may be able to reopen your claim if your condition worsens after claim closure.     Appeal Process: If you disagree with a written determination issued by the insurer or the insurer does not respond to your request, you may appeal to the Department of Administration, , by following the instructions contained in your determination letter. You must appeal the determination within 70 days from the date of the determination letter at [address], [address], [place] [address], or [address], [place] [address]. If you disagree with the  decision, you may appeal to the Department of Administration, . You must file your appeal within 30 days from the date of the  decision letter at [address], [address], [place] [address], or [address], [place] [address]. If you disagree with a decision of an , you may file a petition for judicial review with the District Court. You must do so within 30 days of the Appeal Officer’s decision. You may be represented by an  at your own expense or you may contact the [facility] for possible  representation.    [place]  for Injured Workers (NAIW): If you disagree with a  decision, you may request that NAIW represent you without charge at an  Hearing. For information regarding denial of benefits, you may contact the NAIW at: [address], [address], [place] [address], [phone], or [address], [address], [place] [address], [phone]    To File a Complaint with the Division: If you wish to file a complaint with the  of the Division of Industrial Relations (DIR),  please contact the Workers’ Compensation Section, [address], [address], [place], [place] [address], telephone ([phone]) [phone], or [address], [address], [place], [place] [address], telephone [phone].    For assistance with Workers’ Compensation Issues: You may contact the [facility] Office for Consumer Health Assistance, [address], [address], [place], [place] [address], Toll Free 1-539.547.4459, Web site: http://[facility].[place].gov/Programs/DANIELLE E-mail: [email or web address].[facility]              __________________________________________________________________                                    _________________            Employee Name / Signature                                                                                                                            Date                                                                                                                                                                                                                              D-2 (rev. 10/20)

## 2021-05-11 NOTE — PROGRESS NOTES
"Subjective:      Robin Houser is a 20 y.o. male who presents with Hand Injury (right hand/pinky crush injury from a battery)      HPI:  DOI: 5/11/21  ERASTO:  This is a very pleasant 20-year-old male presenting to the clinic after sustaining a work-related injury.  The patient works at [employer].  Today he was moving a battery weighing approximately 40 pounds.  He was lifting a battery with his left hand with his right hand on the table.  The battery slipped out of his left hand landing on his right hand.  He experienced immediate pain and swelling predominantly located over the right hand fourth and fifth MCP joints.  The patient is right-handed.  He denies any previous injury to this hand.  Pain is described as a constant dull ache.  He has not tried any OTC medications at this time for symptoms.  The pain does not radiate.  He denies any numbness or tingling distally.  Patient does not have a second job.    PMH:   No pertinent past medical history to this problem  MEDS:  Medications were reviewed in EMR  ALLERGIES:  Allergies were reviewed in EMR  SOCHX:  Works at [employer]  FH:   No pertinent family history to this problem       Review of Systems   Musculoskeletal: Positive for joint pain and myalgias.   Neurological: Negative for tingling and weakness.          Objective:     /94   Pulse 85   Temp 36.7 °C (98 °F)   Resp 15   Ht 1.778 m (5' 10\")   Wt 122 kg (270 lb)   SpO2 98%   BMI 38.74 kg/m²      Physical Exam    Constitutional: Pt is oriented to person, place, and time.  Appears well-developed and well-nourished. No distress.   Eyes: Conjunctivae are normal.   Cardiovascular: Normal rate.    Pulmonary/Chest: Effort normal.   Musculoskeletal: Right hand: There is obvious swelling appreciated over the right hand greatest in the area of the fourth and fifth MCP joint.  She does have swelling extending to the fourth and fifth metacarpals.  Range of motion is limited to full flexion of the " MCP joints due to pain.  Full and pain-free wrist range of motion.  Sensation is intact to all fingers distally.  He is tender to palpation over the fourth and fifth MCP joints and metacarpals.  Neurological: Pt is alert and oriented to person, place, and time. Coordination normal.   Skin: Skin is warm. Pt is not diaphoretic. No erythema.   Psychiatric: Pt has a normal mood and affect.  Behavior is normal.     Right hand x-ray:     FINDINGS:  There is an acute minimally anteriorly angulated fracture of the distal metaphysis of the right 5th metacarpal.  No articular communication is identified.  There is overlying soft tissue swelling.     IMPRESSION:     1.  Acute minimally anteriorly angulated fracture of the distal metaphysis of the right 5th metacarpal.     2.  No articular communication identified.     Assessment/Plan:        1. Contusion of right hand, initial encounter    - DX-HAND 3+ RIGHT; Future    2. Closed displaced fracture of other part of fifth metacarpal bone of right hand, initial encounter    - REFERRAL TO SPORTS MEDICINE  - REFERRAL TO OCCUPATIONAL MEDICINE    Patient is to continue wearing splint on right hand until next follow-up visit.  No use of right hand while at work.  Ice and elevation recommended.  Alternate Tylenol and ibuprofen if needed for pain.  A referral was placed for the patient to follow-up with sports medicine and occupational medicine.  If possible I would like follow-up in the next 7 days.  Patient does have a minimally angulated fracture of the right fifth metacarpal.  Was placed in an ulnar gutter splint.  Encouraged to call if he has any questions or concerns return to clinic for any worsening symptoms.

## 2021-05-17 ENCOUNTER — OCCUPATIONAL MEDICINE (OUTPATIENT)
Dept: URGENT CARE | Facility: PHYSICIAN GROUP | Age: 21
End: 2021-05-17
Payer: COMMERCIAL

## 2021-05-17 VITALS
SYSTOLIC BLOOD PRESSURE: 120 MMHG | OXYGEN SATURATION: 100 % | HEIGHT: 70 IN | WEIGHT: 270 LBS | BODY MASS INDEX: 38.65 KG/M2 | TEMPERATURE: 97.8 F | HEART RATE: 91 BPM | RESPIRATION RATE: 16 BRPM | DIASTOLIC BLOOD PRESSURE: 76 MMHG
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
## 2021-05-17 DIAGNOSIS — S62.396A CLOSED DISPLACED FRACTURE OF OTHER PART OF FIFTH METACARPAL BONE OF RIGHT HAND, INITIAL ENCOUNTER: ICD-10-CM

## 2021-05-17 PROCEDURE — 99213 OFFICE O/P EST LOW 20 MIN: CPT | Performed by: NURSE PRACTITIONER

## 2021-05-17 ASSESSMENT — ENCOUNTER SYMPTOMS
FOCAL WEAKNESS: 1
SENSORY CHANGE: 0
FEVER: 0
NAUSEA: 0
VOMITING: 0
TINGLING: 0
CHILLS: 0

## 2021-05-17 NOTE — LETTER
[facility]  [address]  [place] [address]  Phone:  [phone] - Fax:  [phone]   [facility] Progress Report and Disability Certification  Date of Service: 5/17/2021   No Show:  No  Date / Time of Next Visit: 5/24/2021 [facility] 8:00AM   Claim Information   Patient Name: Robin Houser  Claim Number:     Employer: [employer]  Date of Injury: 5/11/2021     Insurer / TPA: [employer]  ID / SSN:     Occupation:   Diagnosis: Diagnoses of Closed displaced fracture of other part of fifth metacarpal bone of right hand, initial encounter and Work related injury were pertinent to this visit.    Medical Information   Related to Industrial Injury? Yes    Subjective Complaints:  Pt presents for evaluation of an existing work comp injury.   DOI: 5/11/21  ERASTO:  This is a very pleasant 20-year-old male presenting to the clinic after sustaining a work-related injury.  The patient works at [employer].  Today he was moving a battery weighing approximately 40 pounds.  He was lifting a battery with his left hand with his right hand on the table.  The battery slipped out of his left hand landing on his right hand.  He experienced immediate pain and swelling predominantly located over the right hand fourth and fifth MCP joints.  The patient is right-handed.  He denies any previous injury to this hand.  Pain is described as a constant dull ache.  He has not tried any OTC medications at this time for symptoms.  The pain does not radiate.  He denies any numbness or tingling distally.  Patient does not have a second job.  Update: 5/17/2021. Robin states minimal pain to his right hand and uses Ibuprofen when needed. His pain is well controlled with NSAIDS, ice and elevation. He is adhering to set work restrictions and states this is tolerable. Splint remains in place. He has not yet heard from occupational med or sports med. Negative for numbness or  tingling.        Objective Findings: Musculoskeletal:      Left hand: Bony tenderness present. Decreased range of motion. Decreased strength. Normal sensation.      Cervical back: Normal range of motion and neck supple.      Comments: Ecchymosis and swelling present of fourth and fifth metacarpals.  Tenderness to palpation.        Pre-Existing Condition(s): none   Assessment:   Condition Same    Status: Additional Care Required  Permanent Disability:No    Plan: Transfer Care    Diagnostics:      Comments:       Disability Information   Status: Released to Restricted Duty    From:  5/17/2021  Through: 5/24/2021 Restrictions are: Temporary   Physical Restrictions   Sitting:    Standing:    Stooping:    Bending:      Squatting:    Walking:    Climbing:    Pushing:      Pulling:    Other:    Reaching Above Shoulder (L):   Reaching Above Shoulder (R):       Reaching Below Shoulder (L):    Reaching Below Shoulder (R):      Not to exceed Weight Limits   Carrying(hrs):   Weight Limit(lb):   Lifting(hrs):   Weight  Limit(lb):     Comments: Continue with set work restrictions (no use of right hand), continue wearing splint at all times, continue with ice, rest, elevation and NSAIDs/Tylenol as needed for relief of pain.  Patient to follow-up with occupational medicine tomorrow.   AVS handout given and reviewed with patient. Pt educated on red flags and when to seek treatment back in ER or UC.     Repetitive Actions   Hands: i.e. Fine Manipulations from Grasping:     Feet: i.e. Operating Foot Controls:     Driving / Operate Machinery:     Provider Name:   AURELIO Peoples Physician Signature:  Physician Name:     Clinic Name / Location: [address] [address] Clinic Phone Number: Dept: [phone]   Appointment Time: 8:00 Am Visit Start Time: 8:05 AM   Check-In Time:  7:34 Am Visit Discharge Time:  8:55 AM   Original-Treating Physician or Chiropractor    Page 2-Insurer/TPA     Page 3-Employer    Page 4-Employee

## 2021-05-17 NOTE — PROGRESS NOTES
Subjective:     Robin Houser is a 20 y.o. male who presents for Work-Related Injury (FV/ DOI 5-11-21/ R pinky injury )      HPI  Pt presents for evaluation of an existing work comp injury.   DOI: 5/11/21  ERASTO:  This is a very pleasant 20-year-old male presenting to the clinic after sustaining a work-related injury.  The patient works at [employer].  Today he was moving a battery weighing approximately 40 pounds.  He was lifting a battery with his left hand with his right hand on the table.  The battery slipped out of his left hand landing on his right hand.  He experienced immediate pain and swelling predominantly located over the right hand fourth and fifth MCP joints.  The patient is right-handed.  He denies any previous injury to this hand.  Pain is described as a constant dull ache.  He has not tried any OTC medications at this time for symptoms.  The pain does not radiate.  He denies any numbness or tingling distally.  Patient does not have a second job.  Update: 5/17/2021. Robin states minimal pain to his right hand and uses Ibuprofen when needed. His pain is well controlled with NSAIDS, ice and elevation. He is adhering to set work restrictions and states this is tolerable. Splint remains in place. He has not yet heard from occupational med or sports med. Negative for numbness or tingling.     Review of Systems   Constitutional: Negative for chills and fever.   Gastrointestinal: Negative for nausea and vomiting.   Musculoskeletal: Positive for joint pain.   Neurological: Positive for focal weakness. Negative for tingling and sensory change.       PMH:   Past Medical History:   Diagnosis Date   • Allergy     dust, perfume, pollen     ALLERGIES: No Known Allergies  SURGHX: No past surgical history on file.  SOCHX:   Social History     Socioeconomic History   • Marital status: Single     Spouse name: Not on file   • Number of children: Not on file   • Years of education: Not on file   • Highest education  level: Not on file   Occupational History   • Not on file   Tobacco Use   • Smoking status: Never Smoker   • Smokeless tobacco: Never Used   Vaping Use   • Vaping Use: Never used   Substance and Sexual Activity   • Alcohol use: No   • Drug use: No   • Sexual activity: Never     Comment: 7th grade - [employer]   Other Topics Concern   • Behavioral problems No   • Interpersonal relationships Yes   • Sad or not enjoying activities No   • Suicidal thoughts No   • Poor school performance Not Asked   • Reading difficulties No   • Speech difficulties No   • Writing difficulties No   • Inadequate sleep No   • Excessive TV viewing No   • Excessive video game use Yes   • Inadequate exercise Yes   • Sports related No   • Poor diet No   • Family concerns for drug/alcohol abuse No   • Poor oral hygiene No   • Bike safety Yes   • Family concerns vehicle safety No   Social History Narrative   • Not on file     Social Determinants of Health     Financial Resource Strain:    • Difficulty of Paying Living Expenses:    Food Insecurity:    • Worried About Running Out of Food in the Last Year:    • Ran Out of Food in the Last Year:    Transportation Needs:    • Lack of Transportation (Medical):    • Lack of Transportation (Non-Medical):    Physical Activity:    • Days of Exercise per Week:    • Minutes of Exercise per Session:    Stress:    • Feeling of Stress :    Social Connections:    • Frequency of Communication with Friends and Family:    • Frequency of Social Gatherings with Friends and Family:    • Attends Mosque Services:    • Active Member of Clubs or Organizations:    • Attends Club or Organization Meetings:    • Marital Status:    Intimate Partner Violence:    • Fear of Current or Ex-Partner:    • Emotionally Abused:    • Physically Abused:    • Sexually Abused:      FH:   Family History   Problem Relation Age of Onset   • Hypertension Father    • Lung Disease Father         asthma   • Hyperlipidemia Mother    • Diabetes  "Neg Hx    • Genetic Disorder Neg Hx    • Genitourinary () Problems Neg Hx    • Heart Disease Neg Hx    • Non-contributory Neg Hx          Objective:   /76   Pulse 91   Temp 36.6 °C (97.8 °F) (Temporal)   Resp 16   Ht 1.778 m (5' 10\")   Wt 122 kg (270 lb)   SpO2 100%   BMI 38.74 kg/m²     Physical Exam  Vitals and nursing note reviewed.   Constitutional:       General: He is not in acute distress.     Appearance: Normal appearance. He is not ill-appearing.   HENT:      Head: Normocephalic and atraumatic.      Right Ear: External ear normal.      Left Ear: External ear normal.      Nose: No congestion or rhinorrhea.      Mouth/Throat:      Mouth: Mucous membranes are moist.   Eyes:      Extraocular Movements: Extraocular movements intact.      Pupils: Pupils are equal, round, and reactive to light.   Cardiovascular:      Rate and Rhythm: Normal rate and regular rhythm.      Pulses: Normal pulses.      Heart sounds: Normal heart sounds.   Pulmonary:      Effort: Pulmonary effort is normal.      Breath sounds: Normal breath sounds.   Abdominal:      General: Abdomen is flat. Bowel sounds are normal.      Palpations: Abdomen is soft.      Tenderness: There is no abdominal tenderness. There is no right CVA tenderness or left CVA tenderness.   Musculoskeletal:      Left hand: Bony tenderness present. Decreased range of motion. Decreased strength. Normal sensation.      Cervical back: Normal range of motion and neck supple.      Comments: Ecchymosis and swelling present of fourth and fifth metacarpals.  Tenderness to palpation.   Skin:     General: Skin is warm and dry.      Capillary Refill: Capillary refill takes less than 2 seconds.   Neurological:      General: No focal deficit present.      Mental Status: He is alert and oriented to person, place, and time. Mental status is at baseline.   Psychiatric:         Mood and Affect: Mood normal.         Behavior: Behavior normal.         Thought Content: " Thought content normal.         Judgment: Judgment normal.         Assessment/Plan:   Assessment    1. Closed displaced fracture of other part of fifth metacarpal bone of right hand, initial encounter     2. Work related injury       Continue with set work restrictions (no use of right hand), continue wearing splint at all times, continue with ice, rest, elevation and NSAIDs/Tylenol as needed for relief of pain.  Patient to follow-up with occupational medicine tomorrow.   AVS handout given and reviewed with patient. Pt educated on red flags and when to seek treatment back in ER or UC.

## 2021-05-17 NOTE — LETTER
[facility]  [address] [place] [address]  Phone:  [phone] - Fax:  [phone]   [facility] Progress Report and Disability Certification  Date of Service: 5/17/2021   No Show:  No  Date / Time of Next Visit: 5/24/2021   Claim Information   Patient Name: Robin Houser  Claim Number:     Employer: [employer]  Date of Injury: 5/11/2021     Insurer / TPA: [employer]  ID / SSN:     Occupation:   Diagnosis: Diagnoses of Closed displaced fracture of other part of fifth metacarpal bone of right hand, initial encounter and Work related injury were pertinent to this visit.    Medical Information   Related to Industrial Injury? Yes    Subjective Complaints:  Pt presents for evaluation of an existing work comp injury.   DOI: 5/11/21  ERASTO:  This is a very pleasant 20-year-old male presenting to the clinic after sustaining a work-related injury.  The patient works at [employer].  Today he was moving a battery weighing approximately 40 pounds.  He was lifting a battery with his left hand with his right hand on the table.  The battery slipped out of his left hand landing on his right hand.  He experienced immediate pain and swelling predominantly located over the right hand fourth and fifth MCP joints.  The patient is right-handed.  He denies any previous injury to this hand.  Pain is described as a constant dull ache.  He has not tried any OTC medications at this time for symptoms.  The pain does not radiate.  He denies any numbness or tingling distally.  Patient does not have a second job.  Update: 5/17/2021. Robin states minimal pain to his right hand and uses Ibuprofen when needed. His pain is well controlled with NSAIDS, ice and elevation. He is adhering to set work restrictions and states this is tolerable. Splint remains in place. He has not yet heard from occupational med or sports med. Negative for numbness or tingling.         Objective Findings: Musculoskeletal:      Left hand: Bony tenderness present. Decreased range of motion. Decreased strength. Normal sensation.      Cervical back: Normal range of motion and neck supple.      Comments: Ecchymosis and swelling present of fourth and fifth metacarpals.  Tenderness to palpation.        Pre-Existing Condition(s): none   Assessment:   Condition Same    Status: Additional Care Required  Permanent Disability:No    Plan: Transfer Care    Diagnostics:      Comments:       Disability Information   Status: Released to Restricted Duty    From:  5/17/2021  Through: 5/24/2021 Restrictions are: Temporary   Physical Restrictions   Sitting:    Standing:    Stooping:    Bending:      Squatting:    Walking:    Climbing:    Pushing:      Pulling:    Other:    Reaching Above Shoulder (L):   Reaching Above Shoulder (R):       Reaching Below Shoulder (L):    Reaching Below Shoulder (R):      Not to exceed Weight Limits   Carrying(hrs):   Weight Limit(lb):   Lifting(hrs):   Weight  Limit(lb):     Comments: Continue with set work restrictions (no use of right hand), continue wearing splint at all times, continue with ice, rest, elevation and NSAIDs/Tylenol as needed for relief of pain.  Patient to follow-up with occupational medicine tomorrow.   AVS handout given and reviewed with patient. Pt educated on red flags and when to seek treatment back in ER or UC.     Repetitive Actions   Hands: i.e. Fine Manipulations from Grasping:     Feet: i.e. Operating Foot Controls:     Driving / Operate Machinery:     Provider Name:   AURELIO Peoples Physician Signature:  Physician Name:     Clinic Name / Location: [address] [address] Clinic Phone Number: Dept: [phone]   Appointment Time: 8:00 Am Visit Start Time: 8:05 AM   Check-In Time:  7:34 Am Visit Discharge Time:  8:57 AM   Original-Treating Physician or Chiropractor    Page 2-Insurer/TPA    Page 3-Employer     Page 4-Employee

## 2021-05-19 ENCOUNTER — OCCUPATIONAL MEDICINE (OUTPATIENT)
Dept: OCCUPATIONAL MEDICINE | Facility: CLINIC | Age: 21
End: 2021-05-19
Payer: COMMERCIAL

## 2021-05-19 VITALS
HEIGHT: 70 IN | DIASTOLIC BLOOD PRESSURE: 80 MMHG | OXYGEN SATURATION: 98 % | HEART RATE: 83 BPM | SYSTOLIC BLOOD PRESSURE: 130 MMHG | WEIGHT: 283 LBS | TEMPERATURE: 98.8 F | BODY MASS INDEX: 40.52 KG/M2 | RESPIRATION RATE: 16 BRPM

## 2021-05-19 DIAGNOSIS — S62.396D CLOSED DISPLACED FRACTURE OF OTHER PART OF FIFTH METACARPAL BONE OF RIGHT HAND WITH ROUTINE HEALING, SUBSEQUENT ENCOUNTER: ICD-10-CM

## 2021-05-19 PROCEDURE — 99202 OFFICE O/P NEW SF 15 MIN: CPT | Performed by: PREVENTIVE MEDICINE

## 2021-05-19 NOTE — PROGRESS NOTES
"Subjective:     Robin Houser is a 20 y.o. male who presents for Follow-Up (WC DOI: 5/11/21 right hand; Rm 17)      DOI: 5/11/2021:  20-year-old male presents for injury to the right hand. ERASTO: He was moving a battery weighing approximately 40 pounds.  He was lifting a battery with his left hand with his right hand on the table when the battery slipped out of his left hand landing on his right hand.  He experienced immediate pain and swelling predominantly located over the right hand fourth and fifth MCP joints. He was seen in the [facility], XR showed \"Acute minimally anteriorly angulated fracture of the distal metaphysis of the right 5th metacarpal.\"  Patient states overall pain is improved and significantly he states that he has not really required any ibuprofen for the last few days.  Patient denies any radiating pain, numbness or tingling.  Pain is centered at the shaft of the fifth metacarpal bone.  He states swelling has reduced.  He states he has taken it out of the Ortho-Glass splint to shower and a few times to let it breathe.  Has not heard about any sports medicine referral being approved.    ROS: All systems were reviewed on intake form, form was reviewed and signed. See scanned documents in media. Pertinent positives and negatives included in HPI.    PMH: No pertinent past medical history to this problem  MEDS: Medications were reviewed in Epic  ALLERGIES: No Known Allergies  SOCHX: Works as a  at [employer]  FH: No pertinent family history to this problem       Objective:     /80   Pulse 83   Temp 37.1 °C (98.8 °F)   Resp 16   Ht 1.778 m (5' 10\")   Wt (!) 128 kg (283 lb)   SpO2 98%   BMI 40.61 kg/m²     Constitutional: Patient is in no acute distress. Appears well-developed and well-nourished.   HENT: Normocephalic and atraumatic. EOM are normal. No scleral icterus.   Cardiovascular: Normal rate.    Pulmonary/Chest: Effort normal. No respiratory distress.   Neurological: " Patient is alert and oriented to person, place, and time.   Skin: Skin is warm and dry.   Psychiatric: Normal mood and affect. Behavior is normal.     Right hand: Ortho-Glass splint in place.  Area of tenderness indicated over the MCP and distal fifth metacarpal bone.  No visible swelling.  Distal neurovascular intact.    Assessment/Plan:       1. Closed displaced fracture of other part of fifth metacarpal bone of right hand with routine healing, subsequent encounter  - REFERRAL TO ORTHOPEDICS    Released to Restricted Duty FROM 5/19/2021 TO 5/26/2021  No use of right hand  Referral to orthopedics pending  Continue in Ortho-Glass splint  Continue OTC ibuprofen as needed  Restricted duty  Follow-up 1 week, if not seen by orthopedics will repeat x-ray at that time    Differential diagnosis, natural history, supportive care, and indications for immediate follow-up discussed.    Approximately 25 minutes were spent in reviewing notes, preparing for visit, obtaining history, exam and evaluation, patient counseling/education and post visit documentation/orders.

## 2021-05-19 NOTE — LETTER
"   [address],   [address] [place] [address]  Phone:  [phone] - Fax:  [phone]   [facility] Progress Report and Disability Certification  Date of Service: 5/19/2021   No Show:  No  Date / Time of Next Visit: 5/26/2021 8:15 AM   Claim Information   Patient Name: Robin Houser  Claim Number:     Employer: [employer]  Date of Injury: 5/11/2021     Insurer / TPA: [employer]  ID / SSN:     Occupation:   Diagnosis: The encounter diagnosis was Closed displaced fracture of other part of fifth metacarpal bone of right hand with routine healing, subsequent encounter.    Medical Information   Related to Industrial Injury? Yes    Subjective Complaints:  DOI: 5/11/2021:  20-year-old male presents for injury to the right hand. ERASTO: He was moving a battery weighing approximately 40 pounds.  He was lifting a battery with his left hand with his right hand on the table when the battery slipped out of his left hand landing on his right hand.  He experienced immediate pain and swelling predominantly located over the right hand fourth and fifth MCP joints. He was seen in the UC[facility], XR showed \"Acute minimally anteriorly angulated fracture of the distal metaphysis of the right 5th metacarpal.\"  Patient states overall pain is improved and significantly he states that he has not really required any ibuprofen for the last few days.  Patient denies any radiating pain, numbness or tingling.  Pain is centered at the shaft of the fifth metacarpal bone.  He states swelling has reduced.  He states he has taken it out of the Ortho-Glass splint to shower and a few times to let it breathe.  Has not heard about any sports medicine referral being approved.   Objective Findings: Right hand: Ortho-Glass splint in place.  Area of tenderness indicated over the MCP and distal fifth metacarpal bone.  No visible swelling.  Distal neurovascular intact.   "   Pre-Existing Condition(s):     Assessment:   Condition Improved    Status: Additional Care Required  Permanent Disability:No    Plan:      Diagnostics:      Comments:  Referral to orthopedics pending  Continue in Ortho-Glass splint  Continue OTC ibuprofen as needed  Restricted duty  Follow-up 1 week, if not seen by orthopedics will repeat x-ray at that time    Disability Information   Status: Released to Restricted Duty    From:  5/19/2021  Through: 5/26/2021 Restrictions are: Temporary   Physical Restrictions   Sitting:    Standing:    Stooping:    Bending:      Squatting:    Walking:    Climbing:    Pushing:      Pulling:    Other:    Reaching Above Shoulder (L):   Reaching Above Shoulder (R):       Reaching Below Shoulder (L):    Reaching Below Shoulder (R):      Not to exceed Weight Limits   Carrying(hrs):   Weight Limit(lb):   Lifting(hrs):   Weight  Limit(lb):     Comments: No use of right hand    Repetitive Actions   Hands: i.e. Fine Manipulations from Grasping:     Feet: i.e. Operating Foot Controls:     Driving / Operate Machinery:     Provider Name:   Britton Graham D.O. Physician Signature:  Physician Name:     Clinic Name / Location: [address],   [address] [address] Clinic Phone Number: Dept: [phone]   Appointment Time: 8:00 Am Visit Start Time: 8:13 AM   Check-In Time:  8:03 Am Visit Discharge Time:  8:37 AM   Original-Treating Physician or Chiropractor    Page 2-Insurer/TPA    Page 3-Employer    Page 4-Employee

## 2021-05-26 ENCOUNTER — APPOINTMENT (OUTPATIENT)
Dept: RADIOLOGY | Facility: IMAGING CENTER | Age: 21
End: 2021-05-26
Attending: PREVENTIVE MEDICINE
Payer: COMMERCIAL

## 2021-05-26 ENCOUNTER — OCCUPATIONAL MEDICINE (OUTPATIENT)
Dept: OCCUPATIONAL MEDICINE | Facility: CLINIC | Age: 21
End: 2021-05-26
Payer: COMMERCIAL

## 2021-05-26 VITALS
HEART RATE: 91 BPM | BODY MASS INDEX: 40.52 KG/M2 | HEIGHT: 70 IN | SYSTOLIC BLOOD PRESSURE: 124 MMHG | OXYGEN SATURATION: 97 % | RESPIRATION RATE: 14 BRPM | DIASTOLIC BLOOD PRESSURE: 82 MMHG | WEIGHT: 283 LBS | TEMPERATURE: 98.5 F

## 2021-05-26 DIAGNOSIS — S62.396D CLOSED DISPLACED FRACTURE OF OTHER PART OF FIFTH METACARPAL BONE OF RIGHT HAND WITH ROUTINE HEALING, SUBSEQUENT ENCOUNTER: ICD-10-CM

## 2021-05-26 PROCEDURE — 99213 OFFICE O/P EST LOW 20 MIN: CPT | Performed by: PREVENTIVE MEDICINE

## 2021-05-26 PROCEDURE — 73130 X-RAY EXAM OF HAND: CPT | Mod: TC,RT | Performed by: PREVENTIVE MEDICINE

## 2021-05-26 ASSESSMENT — ENCOUNTER SYMPTOMS
TINGLING: 0
SENSORY CHANGE: 0

## 2021-05-26 NOTE — LETTER
"[address],   [address] [place] [address]  Phone:  [phone] - Fax:  [phone]   [facility] Progress Report and Disability Certification  Date of Service: 5/26/2021   No Show:  No  Date / Time of Next Visit: 6/10/21 7:45 AM   Claim Information   Patient Name: Robin Houser  Claim Number:     Employer: [employer]  Date of Injury: 5/11/2021     Insurer / TPA: [employer]  ID / SSN:     Occupation:   Diagnosis: The encounter diagnosis was Closed displaced fracture of other part of fifth metacarpal bone of right hand with routine healing, subsequent encounter.    Medical Information   Related to Industrial Injury? Yes    Subjective Complaints:  DOI: 5/11/2021:  20-year-old male presents for injury to the right hand. ERASTO: He was moving a battery weighing approximately 40 pounds.  He was lifting a battery with his left hand with his right hand on the table when the battery slipped out of his left hand landing on his right hand.  He experienced immediate pain and swelling predominantly located over the right hand fourth and fifth MCP joints. XR showed \"Acute minimally anteriorly angulated fracture of the distal metaphysis of the right 5th metacarpal.\"  Patient states overall symptoms have improved a little bit.  He states a little less pain.  However he did take it out of the Ortho-Glass splint and try to move it and it was pretty painful.  Patient just taking OTC medications at this point.  Working light duty.  Has not heard about orthopedic referral being approved yet.   Objective Findings: Right hand: Mild swelling over the ulnar wrist and hand.  Tenderness over the MCP and distal fifth metacarpal bone.  Distal neurovascular intact.    XR Right Hand: Unchanged fracture of the distal metaphysis of the right 5th metacarpal, per my read.  Radiology read pending   Pre-Existing Condition(s):     Assessment:   Condition Same  "   Status: Additional Care Required  Permanent Disability:No    Plan:      Diagnostics:      Comments:  Referral to orthopedics pending, fax information to [facility] fracture clinic to expedite consult  Continue in Ortho-Glass splint  OTC ibuprofen/Tylenol as needed  Restricted  Follow-up 2 weeks if not seen by orthopedics    Disability Information   Status: Released to Restricted Duty    From:  5/26/2021  Through: 6/9/2021 Restrictions are: Temporary   Physical Restrictions   Sitting:    Standing:    Stooping:    Bending:      Squatting:    Walking:    Climbing:    Pushing:      Pulling:    Other:    Reaching Above Shoulder (L):   Reaching Above Shoulder (R):       Reaching Below Shoulder (L):    Reaching Below Shoulder (R):      Not to exceed Weight Limits   Carrying(hrs):   Weight Limit(lb):   Lifting(hrs):   Weight  Limit(lb):     Comments: No use of right hand while at work    Repetitive Actions   Hands: i.e. Fine Manipulations from Grasping:     Feet: i.e. Operating Foot Controls:     Driving / Operate Machinery:     Provider Name:   Britton Graham D.O. Physician Signature:  Physician Name:     Clinic Name / Location: [address],   [address] [address] Clinic Phone Number: Dept: [phone]   Appointment Time: 8:15 Am Visit Start Time: 8:11 AM   Check-In Time:  7:51 Am Visit Discharge Time:  9:00 AM   Original-Treating Physician or Chiropractor    Page 2-Insurer/TPA    Page 3-Employer    Page 4-Employee

## 2021-05-26 NOTE — PROGRESS NOTES
"Subjective:     Robin Houser is a 20 y.o. male who presents for Follow-Up (WC DOI: 5/11/21 Right hand pinky; Better Rm 17)      DOI: 5/11/2021:  20-year-old male presents for injury to the right hand. ERASTO: He was moving a battery weighing approximately 40 pounds.  He was lifting a battery with his left hand with his right hand on the table when the battery slipped out of his left hand landing on his right hand.  He experienced immediate pain and swelling predominantly located over the right hand fourth and fifth MCP joints. XR showed \"Acute minimally anteriorly angulated fracture of the distal metaphysis of the right 5th metacarpal.\"  Patient states overall symptoms have improved a little bit.  He states a little less pain.  However he did take it out of the Ortho-Glass splint and try to move it and it was pretty painful.  Patient just taking OTC medications at this point.  Working light duty.  Has not heard about orthopedic referral being approved yet.    Review of Systems   Neurological: Negative for tingling and sensory change.       SOCHX: Works as a  at [employer]  FH: No pertinent family history to this problem.       Objective:     /82   Pulse 91   Temp 36.9 °C (98.5 °F)   Resp 14   Ht 1.778 m (5' 10\")   Wt (!) 128 kg (283 lb)   SpO2 97%   BMI 40.61 kg/m²     Constitutional: Patient is in no acute distress. Appears well-developed and well-nourished.   Cardiovascular: Normal rate.    Pulmonary/Chest: Effort normal. No respiratory distress.   Neurological: Patient is alert and oriented to person, place, and time.   Skin: Skin is warm and dry.   Psychiatric: Normal mood and affect. Behavior is normal.     Right hand: Mild swelling over the ulnar wrist and hand.  Tenderness over the MCP and distal fifth metacarpal bone.  Distal neurovascular intact.    XR Right Hand: Unchanged fracture of the distal metaphysis of the right 5th metacarpal, per my read.  Radiology read " pending    Assessment/Plan:       1. Closed displaced fracture of other part of fifth metacarpal bone of right hand with routine healing, subsequent encounter  - DX-HAND 3+ RIGHT; Future    Released to Restricted Duty FROM 5/26/2021 TO 6/9/2021  No use of right hand while at work    Referral to orthopedics pending, fax information to [facility] fracture clinic to expedite consult  Continue in Ortho-Glass splint  OTC ibuprofen/Tylenol as needed  Restricted  Follow-up 2 weeks if not seen by orthopedics    Differential diagnosis, natural history, supportive care, and indications for immediate follow-up discussed.    Approximately 25 minutes was spent in preparing for visit, obtaining history, exam and evaluation, patient counseling/education and post visit documentation/orders.

## 2022-06-15 ENCOUNTER — OFFICE VISIT (OUTPATIENT)
Dept: URGENT CARE | Facility: CLINIC | Age: 22
End: 2022-06-15
Payer: COMMERCIAL

## 2022-06-15 ENCOUNTER — APPOINTMENT (OUTPATIENT)
Dept: RADIOLOGY | Facility: MEDICAL CENTER | Age: 22
End: 2022-06-15
Attending: EMERGENCY MEDICINE
Payer: COMMERCIAL

## 2022-06-15 ENCOUNTER — APPOINTMENT (OUTPATIENT)
Dept: RADIOLOGY | Facility: MEDICAL CENTER | Age: 22
End: 2022-06-15
Payer: COMMERCIAL

## 2022-06-15 ENCOUNTER — HOSPITAL ENCOUNTER (EMERGENCY)
Facility: MEDICAL CENTER | Age: 22
End: 2022-06-15
Attending: EMERGENCY MEDICINE
Payer: COMMERCIAL

## 2022-06-15 VITALS
HEART RATE: 83 BPM | DIASTOLIC BLOOD PRESSURE: 80 MMHG | RESPIRATION RATE: 17 BRPM | BODY MASS INDEX: 40.81 KG/M2 | SYSTOLIC BLOOD PRESSURE: 135 MMHG | OXYGEN SATURATION: 95 % | WEIGHT: 284.39 LBS | TEMPERATURE: 99.3 F

## 2022-06-15 VITALS
RESPIRATION RATE: 24 BRPM | SYSTOLIC BLOOD PRESSURE: 122 MMHG | HEIGHT: 70 IN | TEMPERATURE: 98.1 F | WEIGHT: 283 LBS | HEART RATE: 98 BPM | DIASTOLIC BLOOD PRESSURE: 80 MMHG | BODY MASS INDEX: 40.52 KG/M2 | OXYGEN SATURATION: 99 %

## 2022-06-15 DIAGNOSIS — R10.13 EPIGASTRIC PAIN: ICD-10-CM

## 2022-06-15 DIAGNOSIS — R11.2 NON-INTRACTABLE VOMITING WITH NAUSEA, UNSPECIFIED VOMITING TYPE: ICD-10-CM

## 2022-06-15 DIAGNOSIS — R10.84 GENERALIZED ABDOMINAL PAIN: ICD-10-CM

## 2022-06-15 DIAGNOSIS — N50.819 PAIN IN TESTICLE, UNSPECIFIED LATERALITY: ICD-10-CM

## 2022-06-15 DIAGNOSIS — R11.2 NAUSEA AND VOMITING, INTRACTABILITY OF VOMITING NOT SPECIFIED, UNSPECIFIED VOMITING TYPE: ICD-10-CM

## 2022-06-15 DIAGNOSIS — R10.9 FLANK PAIN: ICD-10-CM

## 2022-06-15 LAB
ALBUMIN SERPL BCP-MCNC: 4.7 G/DL (ref 3.2–4.9)
ALBUMIN/GLOB SERPL: 1.5 G/DL
ALP SERPL-CCNC: 101 U/L (ref 30–99)
ALT SERPL-CCNC: 25 U/L (ref 2–50)
ANION GAP SERPL CALC-SCNC: 15 MMOL/L (ref 7–16)
APPEARANCE UR: CLEAR
AST SERPL-CCNC: 14 U/L (ref 12–45)
BACTERIA #/AREA URNS HPF: NEGATIVE /HPF
BASOPHILS # BLD AUTO: 0.5 % (ref 0–1.8)
BASOPHILS # BLD: 0.06 K/UL (ref 0–0.12)
BILIRUB SERPL-MCNC: 0.8 MG/DL (ref 0.1–1.5)
BILIRUB UR QL STRIP.AUTO: NEGATIVE
BUN SERPL-MCNC: 16 MG/DL (ref 8–22)
CALCIUM SERPL-MCNC: 9.4 MG/DL (ref 8.5–10.5)
CHLORIDE SERPL-SCNC: 103 MMOL/L (ref 96–112)
CO2 SERPL-SCNC: 20 MMOL/L (ref 20–33)
COLOR UR: YELLOW
CREAT SERPL-MCNC: 0.98 MG/DL (ref 0.5–1.4)
EKG IMPRESSION: NORMAL
EOSINOPHIL # BLD AUTO: 0.04 K/UL (ref 0–0.51)
EOSINOPHIL NFR BLD: 0.3 % (ref 0–6.9)
EPI CELLS #/AREA URNS HPF: NEGATIVE /HPF
ERYTHROCYTE [DISTWIDTH] IN BLOOD BY AUTOMATED COUNT: 37.2 FL (ref 35.9–50)
GFR SERPLBLD CREATININE-BSD FMLA CKD-EPI: 111 ML/MIN/1.73 M 2
GLOBULIN SER CALC-MCNC: 3.2 G/DL (ref 1.9–3.5)
GLUCOSE SERPL-MCNC: 92 MG/DL (ref 65–99)
GLUCOSE UR STRIP.AUTO-MCNC: NEGATIVE MG/DL
HCT VFR BLD AUTO: 46.2 % (ref 42–52)
HGB BLD-MCNC: 15.8 G/DL (ref 14–18)
HYALINE CASTS #/AREA URNS LPF: ABNORMAL /LPF
IMM GRANULOCYTES # BLD AUTO: 0.09 K/UL (ref 0–0.11)
IMM GRANULOCYTES NFR BLD AUTO: 0.7 % (ref 0–0.9)
KETONES UR STRIP.AUTO-MCNC: 15 MG/DL
LEUKOCYTE ESTERASE UR QL STRIP.AUTO: ABNORMAL
LIPASE SERPL-CCNC: 20 U/L (ref 11–82)
LYMPHOCYTES # BLD AUTO: 0.89 K/UL (ref 1–4.8)
LYMPHOCYTES NFR BLD: 7.3 % (ref 22–41)
MCH RBC QN AUTO: 28.3 PG (ref 27–33)
MCHC RBC AUTO-ENTMCNC: 34.2 G/DL (ref 33.7–35.3)
MCV RBC AUTO: 82.6 FL (ref 81.4–97.8)
MICRO URNS: ABNORMAL
MONOCYTES # BLD AUTO: 0.61 K/UL (ref 0–0.85)
MONOCYTES NFR BLD AUTO: 5 % (ref 0–13.4)
NEUTROPHILS # BLD AUTO: 10.52 K/UL (ref 1.82–7.42)
NEUTROPHILS NFR BLD: 86.2 % (ref 44–72)
NITRITE UR QL STRIP.AUTO: NEGATIVE
NRBC # BLD AUTO: 0 K/UL
NRBC BLD-RTO: 0 /100 WBC
PH UR STRIP.AUTO: 7 [PH] (ref 5–8)
PLATELET # BLD AUTO: 277 K/UL (ref 164–446)
PMV BLD AUTO: 9.9 FL (ref 9–12.9)
POTASSIUM SERPL-SCNC: 4.3 MMOL/L (ref 3.6–5.5)
PROT SERPL-MCNC: 7.9 G/DL (ref 6–8.2)
PROT UR QL STRIP: NEGATIVE MG/DL
RBC # BLD AUTO: 5.59 M/UL (ref 4.7–6.1)
RBC # URNS HPF: ABNORMAL /HPF
RBC UR QL AUTO: NEGATIVE
SODIUM SERPL-SCNC: 138 MMOL/L (ref 135–145)
SP GR UR STRIP.AUTO: 1.03
TROPONIN T SERPL-MCNC: <6 NG/L (ref 6–19)
UROBILINOGEN UR STRIP.AUTO-MCNC: 1 MG/DL
WBC # BLD AUTO: 12.2 K/UL (ref 4.8–10.8)
WBC #/AREA URNS HPF: ABNORMAL /HPF

## 2022-06-15 PROCEDURE — 96374 THER/PROPH/DIAG INJ IV PUSH: CPT

## 2022-06-15 PROCEDURE — 83690 ASSAY OF LIPASE: CPT

## 2022-06-15 PROCEDURE — 99215 OFFICE O/P EST HI 40 MIN: CPT | Performed by: NURSE PRACTITIONER

## 2022-06-15 PROCEDURE — 80053 COMPREHEN METABOLIC PANEL: CPT

## 2022-06-15 PROCEDURE — 93005 ELECTROCARDIOGRAM TRACING: CPT | Performed by: EMERGENCY MEDICINE

## 2022-06-15 PROCEDURE — 700105 HCHG RX REV CODE 258: Performed by: EMERGENCY MEDICINE

## 2022-06-15 PROCEDURE — 36415 COLL VENOUS BLD VENIPUNCTURE: CPT

## 2022-06-15 PROCEDURE — 700111 HCHG RX REV CODE 636 W/ 250 OVERRIDE (IP): Performed by: EMERGENCY MEDICINE

## 2022-06-15 PROCEDURE — 85025 COMPLETE CBC W/AUTO DIFF WBC: CPT

## 2022-06-15 PROCEDURE — 99285 EMERGENCY DEPT VISIT HI MDM: CPT

## 2022-06-15 PROCEDURE — A9270 NON-COVERED ITEM OR SERVICE: HCPCS | Performed by: EMERGENCY MEDICINE

## 2022-06-15 PROCEDURE — 71045 X-RAY EXAM CHEST 1 VIEW: CPT

## 2022-06-15 PROCEDURE — 96375 TX/PRO/DX INJ NEW DRUG ADDON: CPT

## 2022-06-15 PROCEDURE — 93005 ELECTROCARDIOGRAM TRACING: CPT

## 2022-06-15 PROCEDURE — 700102 HCHG RX REV CODE 250 W/ 637 OVERRIDE(OP): Performed by: EMERGENCY MEDICINE

## 2022-06-15 PROCEDURE — 84484 ASSAY OF TROPONIN QUANT: CPT

## 2022-06-15 PROCEDURE — 81001 URINALYSIS AUTO W/SCOPE: CPT

## 2022-06-15 RX ORDER — ONDANSETRON 2 MG/ML
4 INJECTION INTRAMUSCULAR; INTRAVENOUS ONCE
Status: COMPLETED | OUTPATIENT
Start: 2022-06-15 | End: 2022-06-15

## 2022-06-15 RX ORDER — ONDANSETRON 4 MG/1
4 TABLET, ORALLY DISINTEGRATING ORAL EVERY 6 HOURS PRN
Qty: 10 TABLET | Refills: 0 | Status: SHIPPED | OUTPATIENT
Start: 2022-06-15 | End: 2022-11-01

## 2022-06-15 RX ORDER — FAMOTIDINE 20 MG/1
20 TABLET, FILM COATED ORAL 2 TIMES DAILY
Qty: 28 TABLET | Refills: 0 | Status: SHIPPED | OUTPATIENT
Start: 2022-06-15 | End: 2022-06-29

## 2022-06-15 RX ORDER — MORPHINE SULFATE 4 MG/ML
4 INJECTION INTRAVENOUS ONCE
Status: COMPLETED | OUTPATIENT
Start: 2022-06-15 | End: 2022-06-15

## 2022-06-15 RX ORDER — SUCRALFATE 1 G/1
1 TABLET ORAL
Qty: 56 TABLET | Refills: 0 | Status: SHIPPED | OUTPATIENT
Start: 2022-06-15 | End: 2022-06-29

## 2022-06-15 RX ORDER — SODIUM CHLORIDE 9 MG/ML
1000 INJECTION, SOLUTION INTRAVENOUS ONCE
Status: COMPLETED | OUTPATIENT
Start: 2022-06-15 | End: 2022-06-15

## 2022-06-15 RX ADMIN — LIDOCAINE HYDROCHLORIDE 30 ML: 20 SOLUTION OROPHARYNGEAL at 19:20

## 2022-06-15 RX ADMIN — FAMOTIDINE 20 MG: 10 INJECTION, SOLUTION INTRAVENOUS at 19:20

## 2022-06-15 RX ADMIN — MORPHINE SULFATE 4 MG: 4 INJECTION INTRAVENOUS at 19:20

## 2022-06-15 RX ADMIN — SODIUM CHLORIDE 1000 ML: 9 INJECTION, SOLUTION INTRAVENOUS at 19:20

## 2022-06-15 RX ADMIN — ONDANSETRON HYDROCHLORIDE 4 MG: 2 SOLUTION INTRAMUSCULAR; INTRAVENOUS at 19:20

## 2022-06-15 ASSESSMENT — ENCOUNTER SYMPTOMS
CONSTIPATION: 0
ABDOMINAL PAIN: 1
HEARTBURN: 1
SORE THROAT: 0
SHORTNESS OF BREATH: 0
VOMITING: 1
EYE PAIN: 0
MYALGIAS: 0
DIZZINESS: 0
NERVOUS/ANXIOUS: 1
CHILLS: 0
FLANK PAIN: 0
NAUSEA: 1
DIARRHEA: 0
BLOOD IN STOOL: 0
FEVER: 0

## 2022-06-15 ASSESSMENT — PAIN DESCRIPTION - PAIN TYPE
TYPE: ACUTE PAIN
TYPE: ACUTE PAIN

## 2022-06-15 NOTE — ED TRIAGE NOTES
Chief Complaint   Patient presents with   • Abdominal Pain   • Epigastric Pain   • Flank Pain   • N/V     Pain started this morning at 0900.  Protocol initiated.   /84   Pulse 96   Temp 37.9 °C (100.2 °F) (Temporal)   Resp 18   Wt (!) 129 kg (284 lb 6.3 oz)   SpO2 99%   Pt informed of wait times. Educated on triage process.  Asked to return to triage RN for any new or worsening of symptoms. Thanked for patience.

## 2022-06-15 NOTE — PROGRESS NOTES
Subjective:   Robin Houser is a 21 y.o. male who presents for Abdominal Pain (Cramping, acid reflux, vomiting, hard to keep water down, pain going to kidneys. Testicles ache. X this morning. )      HPI  Patient is a 21-year-old male presents to urgent care for evaluation of upper abdominal pain with nausea and vomiting started today.  Patient states that he has been unable to keep any fluids or solids down as every time he eats or drinks he vomits.  He has acid reflux with abdominal cramping.  Fever or chills.  Patient does not drink alcohol at this time.  Patient also notes that he has been having an ache in his bilateral testicles denies any penile discharge, urinary frequency, urgency, dysuria.  Denies any STI exposure.  Patient does feel anxious and feels as if he may be having a panic attack denies any substance or illicit drug use.  Denies any life events.  Denies any chest pain or palpitations however does feel short of breath.   Review of Systems   Constitutional: Negative for chills and fever.   HENT: Negative for sore throat.    Eyes: Negative for pain.   Respiratory: Negative for shortness of breath.    Cardiovascular: Negative for chest pain.   Gastrointestinal: Positive for abdominal pain, heartburn, nausea and vomiting. Negative for blood in stool, constipation, diarrhea and melena.   Genitourinary: Negative for dysuria, flank pain, frequency, hematuria and urgency.        Testicle pain     Musculoskeletal: Negative for myalgias.   Skin: Negative for rash.   Neurological: Negative for dizziness.   Psychiatric/Behavioral: The patient is nervous/anxious.        Medications:    • This patient does not have an active medication from one of the medication groupers.    Allergies: Patient has no known allergies.    Problem List: Robin Houser does not have any pertinent problems on file.    Surgical History:  No past surgical history on file.    Past Social Hx: Robin Houser  reports that he has never smoked. He  "has never used smokeless tobacco. He reports current alcohol use. He reports current drug use. Drug: Inhaled.     Past Family Hx:  Robin Houser family history includes Hyperlipidemia in his mother; Hypertension in his father; Lung Disease in his father.     Problem list, medications, and allergies reviewed by myself today in Epic.     Objective:     /80   Pulse 98   Temp 36.7 °C (98.1 °F)   Resp (!) 24   Ht 1.778 m (5' 10\")   Wt (!) 128 kg (283 lb)   SpO2 99%   BMI 40.61 kg/m²     Physical Exam  Vitals and nursing note reviewed.   Constitutional:       General: He is not in acute distress.     Appearance: He is well-developed.   HENT:      Head: Normocephalic and atraumatic.      Right Ear: External ear normal.      Left Ear: External ear normal.      Nose: Nose normal.      Mouth/Throat:      Mouth: Mucous membranes are moist.   Eyes:      Conjunctiva/sclera: Conjunctivae normal.   Cardiovascular:      Rate and Rhythm: Normal rate.   Pulmonary:      Effort: Pulmonary effort is normal. Tachypnea present. No respiratory distress.      Breath sounds: Normal breath sounds.   Abdominal:      General: Bowel sounds are normal. There is no distension.      Palpations: Abdomen is soft.      Tenderness: There is generalized abdominal tenderness. There is no right CVA tenderness, left CVA tenderness, guarding or rebound. Negative signs include Cohen's sign, Rovsing's sign, McBurney's sign, psoas sign and obturator sign.   Musculoskeletal:         General: Normal range of motion.   Skin:     General: Skin is warm and dry.   Neurological:      General: No focal deficit present.      Mental Status: He is alert and oriented to person, place, and time. Mental status is at baseline.      Gait: Gait (gait at baseline) normal.   Psychiatric:         Mood and Affect: Mood is anxious.         Judgment: Judgment normal.         Assessment/Plan:     Diagnosis and associated orders:     1. Generalized abdominal pain     2. " Nausea and vomiting, intractability of vomiting not specified, unspecified vomiting type     3. Pain in testicle, unspecified laterality        Comments/MDM:     • Patient is a 22-year-old male present with the stated above, on exam patient appears to be tachypneic however speaking in full sentences stating that he feels panicked and as if he is hyperventilating.  Abdomen without any rebound and no guarding, patient unable to provide a urine specimen.  Patient declining scrotal exam despite complaints of testicle pain.  Unclear etiology. At this time, I feel the patient requires a higher level of care including closer monitoring, stat lab work and/or imaging for further evaluation This has been discussed with the patient and they state agreement and understanding.  I offered the patient an ambulance ride and  the patient is declining at this time. The patient is in no acute distress upon clinic departure and will go directly to ED without delay.              Please note that this dictation was created using voice recognition software. I have made a reasonable attempt to correct obvious errors, but I expect that there are errors of grammar and possibly content that I did not discover before finalizing the note.    This note was electronically signed by Jovanni MCINTYRE.

## 2022-06-16 NOTE — ED PROVIDER NOTES
ED Provider Note    CHIEF COMPLAINT  Chief Complaint   Patient presents with   • Abdominal Pain   • Epigastric Pain   • Flank Pain   • N/V       HPI  Patient is a 21-year-old male with no pertinent past medical history who presents emergency room for evaluation of upper abdominal discomfort.  Patient states that for the last several days he has been feeling well and upon awakening this morning he was doing okay.  He went to work and while he was sitting in his chair he began having this sharp and intermittently burning sensation in the upper abdomen.  This progressed, radiating to the right upper quadrant and across the right flank and towards his back.  He has not had this pain before, it has been persistent and moderate to severe in intensity.  He feels a generalized achiness now, and is denying any fevers or any other recent sick exposure.  He has had both several episodes of nonbloody and nonbilious emesis and persistently loose stools without blood.  He is denying any dysuria or hematuria.    PPE Note: I personally donned full PPE for all patient encounters during this visit, including being clean-shaven with an N95 respirator mask, gloves, and goggles.     REVIEW OF SYSTEMS  See HPI for further details. All other systems are negative.     PAST MEDICAL HISTORY   has a past medical history of Allergy.    SOCIAL HISTORY  Social History     Tobacco Use   • Smoking status: Never Smoker   • Smokeless tobacco: Never Used   Vaping Use   • Vaping Use: Never used   Substance and Sexual Activity   • Alcohol use: Yes     Comment: occ   • Drug use: Yes     Types: Inhaled     Comment: marijuana occ   • Sexual activity: Never     Comment: 7th grade - [employer]     SURGICAL HISTORY  patient denies any surgical history    CURRENT MEDICATIONS  Home Medications     Reviewed by Annika Ayala R.N. (Registered Nurse) on 06/15/22 at 1634  Med List Status: Partial   Medication Last Dose Status   albuterol 108 (90 Base)  MCG/ACT Aero Soln inhalation aerosol  Active   ibuprofen (MOTRIN) 200 MG Tab  Active   methylPREDNISolone (MEDROL DOSEPAK) 4 MG Tablet Therapy Pack  Active   Mometasone Furo-Formoterol Fum (DULERA) 100-5 MCG/ACT Aerosol  Active              ALLERGIES  No Known Allergies    PHYSICAL EXAM  VITAL SIGNS: /80   Pulse 83   Temp 37.4 °C (99.3 °F) (Temporal)   Resp 17   Wt (!) 129 kg (284 lb 6.3 oz)   SpO2 95%   BMI 40.81 kg/m²   Pulse ox interpretation: I interpret this pulse ox as normal.  Genl: M sitting in gurney uncomfortably, speaking clearly, appears in moderate distress   Head: NC/AT   ENT: Mucous membranes slightly dry, posterior pharynx clear, uvula midline, nares patent bilaterally   Eyes: Normal sclera, pupils equal round reactive to light  Neck: Supple, FROM, no LAD appreciated  Pulmonary: Lungs are clear to auscultation bilaterally  Chest: No TTP  CV:  tachycardia, no murmur appreciated, pulses 2+ in both upper and lower extremities,  Abdomen: soft, epigastric tenderness, right upper quadrant discomfort, right flank discomfort, inconsistent periumbilical discomfort.  No true McBurney's point tenderness, no pain with bilateral straight leg raise.  ND; mild guarding, no masses palpated, no HSM  : no CVA or suprapubic tenderness  Musculoskeletal: Pain free ROM of the neck. Moving upper and lower extremities and spontaneous in coordinated fashion  Neuro: A&Ox4 (person, place, time, situation), speech fluent, gait steady, no focal deficits appreciated  Skin: No rash or lesions.  No pallor or jaundice.  No cyanosis.  Warm and dry.     DIAGNOSTIC STUDIES / PROCEDURES    EKG    Results for orders placed or performed during the hospital encounter of 06/15/22   EKG   Result Value Ref Range    Report       [facility] Emergency Dept.    Test Date:  2022-06-15  Pt Name:    JEREMY GAUTAM             Department: ER  MRN:        [id number]                      Room:       [facility]  Gender:      Male                         Technician: [id number]  :        2000                   Requested By:ER TRIAGE PROTOCOL  Order #:    [id number]                    Reading MD: Lexa Lema MD    Measurements  Intervals                                Axis  Rate:       85                           P:          29  UT:         152                          QRS:        15  QRSD:       102                          T:          37  QT:         360  QTc:        428    Interpretive Statements  SINUS RHYTHM, Rate 85, normal intervals, normal axis,No acute ST segment  elevations or depressions are noted.  No prior for comparison  Electronically Signed On 6- 21:23:15 PDT by Lexa Lema MD       LABS  Labs Reviewed   COMP METABOLIC PANEL - Abnormal; Notable for the following components:       Result Value    Alkaline Phosphatase 101 (*)     All other components within normal limits   URINALYSIS - Abnormal; Notable for the following components:    Ketones 15 (*)     Leukocyte Esterase Trace (*)     All other components within normal limits   CBC WITH DIFFERENTIAL - Abnormal; Notable for the following components:    WBC 12.2 (*)     Neutrophils-Polys 86.20 (*)     Lymphocytes 7.30 (*)     Neutrophils (Absolute) 10.52 (*)     Lymphs (Absolute) 0.89 (*)     All other components within normal limits   URINE MICROSCOPIC (W/UA) - Abnormal; Notable for the following components:    WBC 2-5 (*)     RBC 2-5 (*)     All other components within normal limits   LIPASE   TROPONIN   ESTIMATED GFR     RADIOLOGY  DX-CHEST-PORTABLE (1 VIEW)   Final Result      1.  There is no acute cardiopulmonary process.        COURSE & MEDICAL DECISION MAKING  Pertinent Labs & Imaging studies reviewed. (See chart for details)    DDX:  Atypical ACS, arrhythmia unlikely, esophagitis, nephrolithiasis, UTI/pyelitis, pyelonephritis unlikely, viral syndrome, foodborne illness, PUD, pancreatitis, gastritis, GERD, cholelithiasis, cholecystitis,  choledocolithiasis    MDM  Number of Diagnoses or Management Options    Initial evaluation at 1800  Seen and evaluated for symptoms as described above.  On initial assessment he is having mostly upper abdominal midline tenderness, with some food associations and new radiation across the abdomen.  His belly is without peritonitis, there is positive bowel sounds, he is not having true CVA tenderness.  The patient's most likely etiology would point towards gastrointestinal in nature.  Differential obtained in triage prompted EKG which is without acute ischemia or infarction, chest x-ray without any evidence of cardiopulmonary process.  Troponin is not elevated.  Patient received GI cocktail and Pepcid and on subsequent reassessment is feeling improved.  Additionally patient's lab work does not show any identifiable metabolic derangement.  There is a very scant leukocytosis of 12.2 with a leftward shift that could be exacerbated by dehydrated state and repeat episodes of retching.  There is no evidence of infection or stone pathology in the urine.  During this period of resuscitation he feels improved.  Tolerating p.o. intake without difficulty after completion of IV fluids.  Subsequent reassessment shows no further localization, no progression to acute surgical abdomen and at this time I feel he is discharged with treatment for possible gastrointestinal ulcer versus esophagitis.  Questions are addressed and discharged home in stable condition    HYDRATION: Based on the patient's presentation of Acute Diarrhea, Acute Vomiting, Dehydration and Tachycardia the patient was given IV fluids. IV Hydration was used because oral hydration was not adequate alone. Upon recheck following hydration, the patient was improved.    FINAL IMPRESSION  Visit Diagnoses     ICD-10-CM   1. Epigastric pain  R10.13   2. Flank pain  R10.9   3. Non-intractable vomiting with nausea, unspecified vomiting type  R11.2     Electronically signed by:  Torey Lindquist M.D., 6/15/2022 5:56 PM   79

## 2022-11-01 ENCOUNTER — OFFICE VISIT (OUTPATIENT)
Dept: MEDICAL GROUP | Facility: PHYSICIAN GROUP | Age: 22
End: 2022-11-01
Payer: COMMERCIAL

## 2022-11-01 VITALS
HEART RATE: 93 BPM | TEMPERATURE: 97.9 F | HEIGHT: 70 IN | SYSTOLIC BLOOD PRESSURE: 130 MMHG | BODY MASS INDEX: 41.95 KG/M2 | WEIGHT: 293 LBS | OXYGEN SATURATION: 96 % | DIASTOLIC BLOOD PRESSURE: 84 MMHG

## 2022-11-01 DIAGNOSIS — E66.9 OBESITY (BMI 35.0-39.9 WITHOUT COMORBIDITY): ICD-10-CM

## 2022-11-01 DIAGNOSIS — Z11.3 SCREENING FOR STD (SEXUALLY TRANSMITTED DISEASE): ICD-10-CM

## 2022-11-01 DIAGNOSIS — Z23 NEED FOR VACCINATION: ICD-10-CM

## 2022-11-01 PROBLEM — R50.9 FEVER: Status: RESOLVED | Noted: 2017-09-12 | Resolved: 2022-11-01

## 2022-11-01 PROBLEM — U07.1 CLINICAL DIAGNOSIS OF COVID-19: Status: RESOLVED | Noted: 2020-11-16 | Resolved: 2022-11-01

## 2022-11-01 PROBLEM — J18.9 PNEUMONIA OF BOTH LOWER LOBES DUE TO INFECTIOUS ORGANISM: Status: RESOLVED | Noted: 2017-06-19 | Resolved: 2022-11-01

## 2022-11-01 PROCEDURE — 90715 TDAP VACCINE 7 YRS/> IM: CPT | Performed by: NURSE PRACTITIONER

## 2022-11-01 PROCEDURE — 90621 MENB-FHBP VACC 2/3 DOSE IM: CPT | Performed by: NURSE PRACTITIONER

## 2022-11-01 PROCEDURE — 90472 IMMUNIZATION ADMIN EACH ADD: CPT | Performed by: NURSE PRACTITIONER

## 2022-11-01 PROCEDURE — 90471 IMMUNIZATION ADMIN: CPT | Performed by: NURSE PRACTITIONER

## 2022-11-01 PROCEDURE — 99395 PREV VISIT EST AGE 18-39: CPT | Mod: 25 | Performed by: NURSE PRACTITIONER

## 2022-11-01 ASSESSMENT — FIBROSIS 4 INDEX: FIB4 SCORE: 0.23

## 2022-11-01 ASSESSMENT — PATIENT HEALTH QUESTIONNAIRE - PHQ9: CLINICAL INTERPRETATION OF PHQ2 SCORE: 0

## 2022-11-01 NOTE — PROGRESS NOTES
Subjective:     CC:   Chief Complaint   Patient presents with    Annual Exam       HPI:   Robin Houser is a 22 y.o. male who presents for an annual exam. He is feeling well and has no complaints.    Health Maintenance  mutlivitamin: yes   Cholesterol Screening: ordered   Diabetes Screening: DEACON lucia in 6/22   Diet: states hit or miss,  some days more processed.   Exercise: walking regularly   Substance Abuse: edibles   Safe in relationship.   Seat belts, bike helmet, gun safety discussed.  Sun protection used.    Cancer screening  Colorectal Cancer Screening: due at 45    Prostate Cancer Screening/PSA: not old enough.     Infectious disease screening/Immunizations  --STI Screening: declines  --Practices safe sex.  --HIV Screening: screened with blood donation   --Immunizations: up to date    He  has a past medical history of Allergy.    He has no past medical history of ASTHMA.  He  has no past surgical history on file.  Family History   Problem Relation Age of Onset    Hypertension Father     Lung Disease Father         asthma    Hyperlipidemia Mother     Diabetes Neg Hx     Genetic Disorder Neg Hx     Genitourinary () Problems Neg Hx     Heart Disease Neg Hx     Non-contributory Neg Hx      Social History     Tobacco Use    Smoking status: Never    Smokeless tobacco: Never   Vaping Use    Vaping Use: Never used   Substance Use Topics    Alcohol use: Yes     Comment: Pt states about 3 drinks per week    Drug use: Yes     Types: Inhaled     Comment: marijuana - rarely - about once per month       Patient Active Problem List    Diagnosis Date Noted    Adjustment disorder with mixed anxiety and depressed mood 08/24/2020    Obesity (BMI 35.0-39.9 without comorbidity) 09/12/2017    Tonsillitis 09/12/2017    Seasonal allergic rhinitis due to pollen 04/04/2017       No current outpatient medications on file.     No current facility-administered medications for this visit.    (including changes today)  Allergies: Patient  "has no known allergies.    Review of Systems   Constitutional: Negative for fever, chills and malaise/fatigue.   HENT: Negative for congestion.    Eyes: Negative for pain.   Respiratory: Negative for cough and shortness of breath.    Cardiovascular: Negative for leg swelling.   Gastrointestinal: Negative for nausea, vomiting, abdominal pain and diarrhea.   Genitourinary: Negative for dysuria and hematuria.   Skin: Negative for rash.   Neurological: Negative for dizziness, focal weakness and headaches.   Endo/Heme/Allergies: Does not bruise/bleed easily.   Psychiatric/Behavioral: Negative for depression.  The patient is not nervous/anxious.      Objective:     /84 (BP Location: Left arm, Patient Position: Sitting, BP Cuff Size: Large adult)   Pulse 93   Temp 36.6 °C (97.9 °F) (Temporal)   Ht 1.778 m (5' 10\") Comment: pt reported  Wt (!) 133 kg (293 lb)   SpO2 96%   BMI 42.04 kg/m²   Body mass index is 42.04 kg/m².  Wt Readings from Last 4 Encounters:   11/01/22 (!) 133 kg (293 lb)   06/15/22 (!) 129 kg (284 lb 6.3 oz)   06/15/22 (!) 128 kg (283 lb)   05/03/22 (!) 127 kg (280 lb)       Physical Exam:  Constitutional: Well-developed and well-nourished. Not diaphoretic. No distress.   Skin: Skin is warm and dry. No rash noted.  Head: Atraumatic without lesions.  Eyes: Conjunctivae and extraocular motions are normal. Pupils are equal, round, and reactive to light. No scleral icterus.   Ears:  External ears unremarkable. Tympanic membranes clear and intact.  Nose: Nares patent. Septum midline. Turbinates without erythema nor edema. No discharge.   Mouth/Throat: Dentition is WNL. Tongue normal. Oropharynx is clear and moist. Posterior pharynx without erythema or exudates.  Neck: Supple, trachea midline. Normal range of motion. No thyromegaly present. No lymphadenopathy--cervical or supraclavicular.  Cardiovascular: Regular rate and rhythm, S1 and S2 without murmur, rubs, or gallops.    Lungs: Effort normal. " Clear to auscultation throughout. No adventitious sounds. No CVA tenderness.  Abdomen: Soft, non tender, and without distention. Active bowel sounds in all four quadrants. No rebound, guarding, masses or HSM.  Extremities: No cyanosis, clubbing, erythema, nor edema. Distal pulses intact and symmetric.   Musculoskeletal: All major joints AROM full in all directions without pain.  Neurological: Alert and oriented x 3. DTRs 2+/3 and symmetric. No cranial nerve deficit. 5/5 myotomes. Sensation intact. Negative Rhomberg.  Psychiatric:  Behavior, mood, and affect are appropriate.    A chaperone was offered to the patient during today's exam. Patient declined chaperone.    Assessment and Plan:     1. Need for vaccination  Meningococcal Vaccine Serogroup B 2-3 Dose (TRUMENBA)    Tdap Vaccine =>8YO IM      2. Screening for STD (sexually transmitted disease)        3. Obesity (BMI 35.0-39.9 without comorbidity)  Lipid Profile          HCM: Complete lipid panel as ordered.  Labs per orders.  Vaccinations per orders.  Counseling about diet, supplements, exercise, skin care and safe sex.    Follow-up: Return in about 1 year (around 11/1/2023), or if symptoms worsen or fail to improve.

## 2023-09-12 ENCOUNTER — OFFICE VISIT (OUTPATIENT)
Dept: MEDICAL GROUP | Facility: PHYSICIAN GROUP | Age: 23
End: 2023-09-12
Payer: COMMERCIAL

## 2023-09-12 VITALS
SYSTOLIC BLOOD PRESSURE: 130 MMHG | OXYGEN SATURATION: 96 % | HEIGHT: 70 IN | TEMPERATURE: 98.1 F | WEIGHT: 283.8 LBS | BODY MASS INDEX: 40.63 KG/M2 | HEART RATE: 67 BPM | DIASTOLIC BLOOD PRESSURE: 70 MMHG

## 2023-09-12 DIAGNOSIS — R06.83 SNORING: ICD-10-CM

## 2023-09-12 DIAGNOSIS — J30.1 SEASONAL ALLERGIC RHINITIS DUE TO POLLEN: ICD-10-CM

## 2023-09-12 DIAGNOSIS — E66.01 OBESITY, CLASS III, BMI 40-49.9 (MORBID OBESITY) (HCC): ICD-10-CM

## 2023-09-12 PROCEDURE — 3078F DIAST BP <80 MM HG: CPT | Performed by: NURSE PRACTITIONER

## 2023-09-12 PROCEDURE — 99213 OFFICE O/P EST LOW 20 MIN: CPT | Performed by: NURSE PRACTITIONER

## 2023-09-12 PROCEDURE — 3075F SYST BP GE 130 - 139MM HG: CPT | Performed by: NURSE PRACTITIONER

## 2023-09-12 ASSESSMENT — ENCOUNTER SYMPTOMS
PALPITATIONS: 0
SORE THROAT: 0
HEMOPTYSIS: 0
WHEEZING: 0
COUGH: 0
SHORTNESS OF BREATH: 0
BLURRED VISION: 0
ORTHOPNEA: 0
WEIGHT LOSS: 0
SINUS PAIN: 0
FEVER: 0

## 2023-09-12 ASSESSMENT — PATIENT HEALTH QUESTIONNAIRE - PHQ9: CLINICAL INTERPRETATION OF PHQ2 SCORE: 0

## 2023-09-12 ASSESSMENT — FIBROSIS 4 INDEX: FIB4 SCORE: 0.24

## 2023-09-12 NOTE — PROGRESS NOTES
Subjective:     Chief Complaint   Patient presents with    Referral Needed     Sleep study snoring pt states he is always congested pt states dad has sleep apnea        HPI:   Robin presents today with     Problem   Snoring    New issue to provider. Presents today with questions about possible sleep study. He reports his girlfriend has noticed increased snoring and has witnessed apnea episodes 3-4 times nightly. He recalls waking 1-2 times per night. Denies increased fatigue during the day, denies shortness of breath or wheezing. He reports that he had COVID in 2021 and has had long term effects of shortness in breath and wheezing with use of a rescue inhaler. However, he has not had any symptoms in the last 6 months and not needed the inhaler. STOP BANG score is 5     Seasonal Allergic Rhinitis Due to Pollen    Chronic. Worsening. He reports constant nasal congestion and increased symptoms during spring and fall. He reports drainage is green. He has tried over the counter medications and flonase which does help but states that it irritates his nares and burns. He has tried neti pots with some relief.   Denies recent infection, frontal or maxillary pressure, sore throat, or ear pain.            No current [facility]-ordered outpatient medications on file.     No current [facility]-ordered facility-administered medications on file.       Health Maintenance:     Review of Systems   Constitutional:  Negative for fever and weight loss.   HENT:  Positive for congestion. Negative for ear discharge, ear pain, sinus pain and sore throat.    Eyes:  Negative for blurred vision.   Respiratory:  Negative for cough, hemoptysis, shortness of breath and wheezing.    Cardiovascular:  Negative for chest pain, palpitations and orthopnea.   Skin:  Negative for rash.         Objective:     Exam:  /70 (BP Location: Left arm, Patient Position: Sitting, BP Cuff Size: Adult)   Pulse 67   Temp 36.7 °C (98.1 °F) (Temporal)   Ht 1.778 m (5'  "10\")   Wt (!) 129 kg (283 lb 12.8 oz)   SpO2 96%   BMI 40.72 kg/m²  Body mass index is 40.72 kg/m².    Physical Exam  Constitutional:       Appearance: Normal appearance.   HENT:      Head: Normocephalic.      Right Ear: Tympanic membrane, ear canal and external ear normal.      Left Ear: Tympanic membrane, ear canal and external ear normal.      Nose: Nose normal.      Mouth/Throat:      Mouth: Mucous membranes are moist.      Pharynx: Oropharynx is clear.   Eyes:      Pupils: Pupils are equal, round, and reactive to light.   Cardiovascular:      Rate and Rhythm: Normal rate and regular rhythm.   Pulmonary:      Effort: Pulmonary effort is normal.      Breath sounds: Normal breath sounds.   Skin:     General: Skin is warm and dry.   Neurological:      Mental Status: He is alert.       Assessment & Plan:     23 y.o. male with the following -     Problem List Items Addressed This Visit       Obesity (BMI 35.0-39.9 without comorbidity)    Seasonal allergic rhinitis due to pollen     Cetrizine by mouth daily  Trial of OTC Sensimist for decreased irritation or children’s flonase           Snoring     Referral to sleep medicine          Relevant Orders    Referral to Pulmonary and Sleep Medicine     Other Visit Diagnoses       Obesity, Class III, BMI 40-49.9 (morbid obesity) (HCC)        Relevant Orders    Patient identified as having weight management issue.  Appropriate orders and counseling given.              Return in about 1 month (around 10/12/2023).        Please note that this dictation was created using voice recognition software. I have made every reasonable attempt to correct obvious errors, but I expect that there are errors of grammar and possibly content that I did not discover before finalizing the note.       "

## 2023-09-15 ENCOUNTER — PATIENT MESSAGE (OUTPATIENT)
Dept: MEDICAL GROUP | Facility: PHYSICIAN GROUP | Age: 23
End: 2023-09-15
Payer: COMMERCIAL

## 2023-09-15 DIAGNOSIS — R06.83 SNORING: ICD-10-CM

## 2023-09-18 ENCOUNTER — PATIENT MESSAGE (OUTPATIENT)
Dept: MEDICAL GROUP | Facility: PHYSICIAN GROUP | Age: 23
End: 2023-09-18

## 2023-09-18 DIAGNOSIS — R06.83 SNORING: ICD-10-CM

## 2023-09-28 ENCOUNTER — TELEPHONE (OUTPATIENT)
Dept: HEALTH INFORMATION MANAGEMENT | Facility: OTHER | Age: 23
End: 2023-09-28
Payer: COMMERCIAL

## 2023-10-18 ENCOUNTER — HOME STUDY (OUTPATIENT)
Dept: SLEEP MEDICINE | Facility: MEDICAL CENTER | Age: 23
End: 2023-10-18
Attending: NURSE PRACTITIONER
Payer: COMMERCIAL

## 2023-10-18 DIAGNOSIS — R06.83 SNORING: ICD-10-CM

## 2023-10-18 PROCEDURE — 95800 SLP STDY UNATTENDED: CPT | Performed by: INTERNAL MEDICINE

## 2023-10-20 NOTE — PROCEDURES
DIAGNOSTIC HOME SLEEP TEST (HST) REPORT WatchPAT      PATIENT ID:  NAME:  Robin Houser  MRN:               [id number]  YOB: 2000  Date of study: 10/18/2023  Sleep Time: 6 hours 28 minutes  Portable monitor/device used: type IV (portable monitor/device)      Impression:        1.  No significant obstructive sleep apnea hypopnea with a PAT apnea hypopnea index (pAHI) of 4.7 (events per hour), a supine pAHI of 3.7,   and a PAT respiratory disturbance index (pRDI) of 16.6 (events per hour.)  These findings are based on 7 channels recording of PAT signal with sleep staging, heart rate, pulse oximetry, actigraphy, body position, snoring and respiratory movement.     The pRDI calculated in a very similar way as the pAHI but an additional type of respiratory events named RERA are also counted. RERA  is the abbreviation for respiratory effort related arousal and is essentially a very short arousal of a few seconds that follows partial occlusion of the airways.  The normal range of the RDI score is also 5 or lower.    2. Oxygenation: O2 Sat. mean O2 sat was 95%,  the rashmi was 86%,  and the maximum O2 was 98 0.1%.  The O2 sat was at or  below 88% for 0.1 minutes of evaluation time. Oxygen Desaturation (>=4%) Index was 1.7 (events per hour.)  The mean HR was 64 BPM.      TECHNICAL DESCRIPTION: The patient underwent home sleep apnea testing using peripheral arterial tone signal technology(WatchPAT™). Monitoring was done with 7 channels recording of PAT signal with sleep staging, heart rate, pulse oximetry, actigraphy, body position, snoring and respiratory movement. Prior to using the device, the patient received verbal and written instructions for its application and was provided with the help desk phone number for additional telephonic instruction with 24-hour availability of qualified personnel to answer questions.      Respiratory events:    pRDI: Total 106 (REM index 13.2 /hr, NREM index 18.1 /hr, All Night  16.6 /hr)    3% pAHI: Total 30 (REM index 7.1 /hr, NREM index 3.6 /hr, All Night 4.7 /hr)    3% pAHIc: Total 5 (REM index 0.9 /hr, NREM index 1.0 /hr, All Night 1.0 /hr)    4% pAHI: Total 11 (All Night 1.7 /hr)    General sleep summary: . Total Recording Time is 7 hours 52 minutes and Total Sleep Time is 6 hours 28 minutes. The patient spent 50.0 minutes in the supine position and 338.9 minutes in the nonsupine position.  Snoring exceeded the 45 dB threshold for 5.3 minutes or 1.4% of the TST.      Recommendations:    The patient has neither an elevated AHI nor significant nocturnal desaturation.  There is no indication for PAP or O2 therapy.  The elevated pRDI indicates upper airways resistance syndrome which can be treated with an oral appliance.      NOTES:     The AHI is the number of apneas (cessation of airflow for 10 seconds or longer) and hypopneas (shallow breaths accompanied by at least a 3% drop in the oxygen saturation) that occur per hour. Less than 5 per hour is normal, 5-15 per hour is mild, 15-30 per hour moderate, and greater than 30 per hour severe.    Patients with sleep apnea are at increased risk of cardiovascular disease including systemic arterial hypertension, pulmonary arterial hypertension, (transient or fixed), transient ischemic attacks, and an elevated risk of stroke, heart attack, sudden death, or arrhythmia between the hours of 11 PM and 6 AM.  Sleep apnea patients have an increased risk of motor vehicle accidents, type 2 diabetes, gastroesophageal reflux, repetitive mechanical trauma to pharyngeal muscles with inflammation and denervation, frequent EEG arousals leading to nonrestorative sleep, excessive daytime somnolence, fatigue, depression, poor pain control, irritability, and a lower quality of life.      Dr. Kody Block M.D.

## 2023-11-03 SDOH — ECONOMIC STABILITY: INCOME INSECURITY: HOW HARD IS IT FOR YOU TO PAY FOR THE VERY BASICS LIKE FOOD, HOUSING, MEDICAL CARE, AND HEATING?: NOT HARD AT ALL

## 2023-11-03 SDOH — ECONOMIC STABILITY: FOOD INSECURITY: WITHIN THE PAST 12 MONTHS, THE FOOD YOU BOUGHT JUST DIDN'T LAST AND YOU DIDN'T HAVE MONEY TO GET MORE.: NEVER TRUE

## 2023-11-03 SDOH — ECONOMIC STABILITY: HOUSING INSECURITY
IN THE LAST 12 MONTHS, WAS THERE A TIME WHEN YOU DID NOT HAVE A STEADY PLACE TO SLEEP OR SLEPT IN A SHELTER (INCLUDING NOW)?: NO

## 2023-11-03 SDOH — ECONOMIC STABILITY: TRANSPORTATION INSECURITY
IN THE PAST 12 MONTHS, HAS THE LACK OF TRANSPORTATION KEPT YOU FROM MEDICAL APPOINTMENTS OR FROM GETTING MEDICATIONS?: NO

## 2023-11-03 SDOH — ECONOMIC STABILITY: FOOD INSECURITY: WITHIN THE PAST 12 MONTHS, YOU WORRIED THAT YOUR FOOD WOULD RUN OUT BEFORE YOU GOT MONEY TO BUY MORE.: NEVER TRUE

## 2023-11-03 SDOH — HEALTH STABILITY: PHYSICAL HEALTH: ON AVERAGE, HOW MANY MINUTES DO YOU ENGAGE IN EXERCISE AT THIS LEVEL?: 20 MIN

## 2023-11-03 SDOH — ECONOMIC STABILITY: INCOME INSECURITY: IN THE LAST 12 MONTHS, WAS THERE A TIME WHEN YOU WERE NOT ABLE TO PAY THE MORTGAGE OR RENT ON TIME?: NO

## 2023-11-03 SDOH — ECONOMIC STABILITY: HOUSING INSECURITY: IN THE LAST 12 MONTHS, HOW MANY PLACES HAVE YOU LIVED?: 1

## 2023-11-03 SDOH — HEALTH STABILITY: PHYSICAL HEALTH: ON AVERAGE, HOW MANY DAYS PER WEEK DO YOU ENGAGE IN MODERATE TO STRENUOUS EXERCISE (LIKE A BRISK WALK)?: 2 DAYS

## 2023-11-03 SDOH — ECONOMIC STABILITY: TRANSPORTATION INSECURITY
IN THE PAST 12 MONTHS, HAS LACK OF RELIABLE TRANSPORTATION KEPT YOU FROM MEDICAL APPOINTMENTS, MEETINGS, WORK OR FROM GETTING THINGS NEEDED FOR DAILY LIVING?: NO

## 2023-11-03 SDOH — ECONOMIC STABILITY: TRANSPORTATION INSECURITY
IN THE PAST 12 MONTHS, HAS LACK OF TRANSPORTATION KEPT YOU FROM MEETINGS, WORK, OR FROM GETTING THINGS NEEDED FOR DAILY LIVING?: NO

## 2023-11-03 SDOH — HEALTH STABILITY: MENTAL HEALTH
STRESS IS WHEN SOMEONE FEELS TENSE, NERVOUS, ANXIOUS, OR CAN'T SLEEP AT NIGHT BECAUSE THEIR MIND IS TROUBLED. HOW STRESSED ARE YOU?: ONLY A LITTLE

## 2023-11-03 ASSESSMENT — SOCIAL DETERMINANTS OF HEALTH (SDOH)
WITHIN THE PAST 12 MONTHS, YOU WORRIED THAT YOUR FOOD WOULD RUN OUT BEFORE YOU GOT THE MONEY TO BUY MORE: NEVER TRUE
DO YOU BELONG TO ANY CLUBS OR ORGANIZATIONS SUCH AS CHURCH GROUPS UNIONS, FRATERNAL OR ATHLETIC GROUPS, OR SCHOOL GROUPS?: NO
HOW OFTEN DO YOU GET TOGETHER WITH FRIENDS OR RELATIVES?: ONCE A WEEK
DO YOU BELONG TO ANY CLUBS OR ORGANIZATIONS SUCH AS CHURCH GROUPS UNIONS, FRATERNAL OR ATHLETIC GROUPS, OR SCHOOL GROUPS?: NO
HOW OFTEN DO YOU ATTEND CHURCH OR RELIGIOUS SERVICES?: NEVER
HOW OFTEN DO YOU HAVE SIX OR MORE DRINKS ON ONE OCCASION: NEVER
HOW OFTEN DO YOU ATTENT MEETINGS OF THE CLUB OR ORGANIZATION YOU BELONG TO?: NEVER
HOW OFTEN DO YOU ATTENT MEETINGS OF THE CLUB OR ORGANIZATION YOU BELONG TO?: NEVER
HOW HARD IS IT FOR YOU TO PAY FOR THE VERY BASICS LIKE FOOD, HOUSING, MEDICAL CARE, AND HEATING?: NOT HARD AT ALL
IN A TYPICAL WEEK, HOW MANY TIMES DO YOU TALK ON THE PHONE WITH FAMILY, FRIENDS, OR NEIGHBORS?: MORE THAN THREE TIMES A WEEK
ARE YOU MARRIED, WIDOWED, DIVORCED, SEPARATED, NEVER MARRIED, OR LIVING WITH A PARTNER?: NEVER MARRIED
IN A TYPICAL WEEK, HOW MANY TIMES DO YOU TALK ON THE PHONE WITH FAMILY, FRIENDS, OR NEIGHBORS?: MORE THAN THREE TIMES A WEEK
ARE YOU MARRIED, WIDOWED, DIVORCED, SEPARATED, NEVER MARRIED, OR LIVING WITH A PARTNER?: NEVER MARRIED
HOW OFTEN DO YOU GET TOGETHER WITH FRIENDS OR RELATIVES?: ONCE A WEEK
HOW OFTEN DO YOU HAVE A DRINK CONTAINING ALCOHOL: MONTHLY OR LESS
HOW OFTEN DO YOU ATTEND CHURCH OR RELIGIOUS SERVICES?: NEVER
HOW MANY DRINKS CONTAINING ALCOHOL DO YOU HAVE ON A TYPICAL DAY WHEN YOU ARE DRINKING: 1 OR 2

## 2023-11-03 ASSESSMENT — LIFESTYLE VARIABLES
SKIP TO QUESTIONS 9-10: 1
HOW OFTEN DO YOU HAVE SIX OR MORE DRINKS ON ONE OCCASION: NEVER
AUDIT-C TOTAL SCORE: 1
HOW MANY STANDARD DRINKS CONTAINING ALCOHOL DO YOU HAVE ON A TYPICAL DAY: 1 OR 2
HOW OFTEN DO YOU HAVE A DRINK CONTAINING ALCOHOL: MONTHLY OR LESS

## 2023-11-06 ENCOUNTER — OFFICE VISIT (OUTPATIENT)
Dept: MEDICAL GROUP | Facility: PHYSICIAN GROUP | Age: 23
End: 2023-11-06
Payer: COMMERCIAL

## 2023-11-06 VITALS
BODY MASS INDEX: 40.94 KG/M2 | WEIGHT: 286 LBS | DIASTOLIC BLOOD PRESSURE: 72 MMHG | HEART RATE: 78 BPM | OXYGEN SATURATION: 96 % | SYSTOLIC BLOOD PRESSURE: 128 MMHG | TEMPERATURE: 97.9 F | HEIGHT: 70 IN

## 2023-11-06 DIAGNOSIS — R09.81 CHRONIC NASAL CONGESTION: ICD-10-CM

## 2023-11-06 DIAGNOSIS — Z00.00 WELLNESS EXAMINATION: ICD-10-CM

## 2023-11-06 PROCEDURE — 3078F DIAST BP <80 MM HG: CPT | Performed by: NURSE PRACTITIONER

## 2023-11-06 PROCEDURE — 3074F SYST BP LT 130 MM HG: CPT | Performed by: NURSE PRACTITIONER

## 2023-11-06 PROCEDURE — 99395 PREV VISIT EST AGE 18-39: CPT | Performed by: NURSE PRACTITIONER

## 2023-11-06 ASSESSMENT — LIFESTYLE VARIABLES
DO YOU EVER USE ALCOHOL OR DRUGS WHILE YOU ARE BY YOURSELF ALONE: NO
DURING THE PAST 12 MONTHS, ON HOW MANY DAYS DID YOU USE ANY TOBACCO OR NICOTINE PRODUCTS: 0
DURING THE PAST 12 MONTHS, ON HOW MANY DAYS DID YOU DRINK MORE THAN A FEW SIPS OF BEER, WINE, OR ANY DRINK CONTAINING ALCOHOL: 3
DURING THE PAST 12 MONTHS, ON HOW MANY DAYS DID YOU USE ANYTHING ELSE TO GET HIGH: 0
PART A TOTAL SCORE: 5
DO YOU EVER FORGET THINGS YOU DID WHILE USING ALCOHOL OR DRUGS: NO
DURING THE PAST 12 MONTHS, ON HOW MANY DAYS DID YOU USE ANY MARIJUANA: 2
HAVE YOU EVER RIDDEN IN A CAR DRIVEN BY SOMEONE WHO WAS HIGH OR HAD BEEN USING ALCOHOL OR DRUGS: NO
DO YOUR FAMILY OR FRIENDS EVER TELL YOU THAT YOU SHOULD CUT DOWN ON YOUR DRINKING OR DRUG USE: NO
HAVE YOU EVER GOTTEN IN TROUBLE WHILE YOU WERE USING ALCOHOL OR DRUGS: NO
DO YOU EVER USE ALCOHOL OR DRUGS TO RELAX, FEEL BETTER ABOUT YOURSELF, OR FIT IN: NO

## 2023-11-06 ASSESSMENT — FIBROSIS 4 INDEX: FIB4 SCORE: 0.24

## 2023-11-07 NOTE — PROGRESS NOTES
Subjective:     CC:   Chief Complaint   Patient presents with    Annual Exam       HPI:   Robin Houser is a 23 y.o. male who presents for an annual exam. He is feeling well and has no complaints.    Health Maintenance  Advanced directive: not on file   Multi: yes  Cholesterol Screening: ordered   Diabetes Screening: ordered     Anticipatory Guidance  Diet: overall healthy    Exercise: walking 3 mi per day.   Substance Abuse: no concern  Safe in relationship.   Seat belts, bike helmet, gun safety discussed.  Sun protection used.    Cancer screening  Colorectal Cancer Screenin    Prostate Cancer Screening/PSA: 55     Infectious disease screening/Immunizations  --STI Screening: no thanks  --Practices safe sex.  --HIV Screening: up to date   --Hepatitis C Screening: up to date   --Immunizations: discussed      He  has a past medical history of Allergy.    He has no past medical history of ASTHMA.  He  has no past surgical history on file.  Family History   Problem Relation Age of Onset    Hyperlipidemia Mother     Hypertension Father     Lung Disease Father         Asthma    Ovarian Cancer Paternal Aunt     Diabetes Neg Hx     Genetic Disorder Neg Hx     Genitourinary () Problems Neg Hx     Heart Disease Neg Hx     Non-contributory Neg Hx      Social History     Tobacco Use    Smoking status: Never    Smokeless tobacco: Never   Vaping Use    Vaping Use: Never used   Substance Use Topics    Alcohol use: Yes     Comment: Pt states about 3 drinks per week    Drug use: Yes     Frequency: 1.0 times per week     Types: Marijuana, Oral     Comment: Maybe used once every few months.       Patient Active Problem List    Diagnosis Date Noted    Snoring 2023    Adjustment disorder with mixed anxiety and depressed mood 2020    Obesity (BMI 35.0-39.9 without comorbidity) 2017    Tonsillitis 2017    Seasonal allergic rhinitis due to pollen 2017       No current outpatient medications on file.  "    No current facility-administered medications for this visit.    (including changes today)  Allergies: Patient has no known allergies.    Review of Systems   Constitutional: Negative for fever, chills and malaise/fatigue.   HENT: Negative for congestion.    Eyes: Negative for pain.   Respiratory: Negative for cough and shortness of breath.    Cardiovascular: Negative for leg swelling.   Gastrointestinal: Negative for nausea, vomiting, abdominal pain and diarrhea.   Genitourinary: Negative for dysuria and hematuria.   Skin: Negative for rash.   Neurological: Negative for dizziness, focal weakness and headaches.   Endo/Heme/Allergies: Does not bleed easily.   Psychiatric/Behavioral: Negative for depression.  The patient is not nervous/anxious.      Objective:     /72 (BP Location: Left arm, Patient Position: Sitting, BP Cuff Size: Adult)   Pulse 78   Temp 36.6 °C (97.9 °F) (Temporal)   Ht 1.778 m (5' 10\")   Wt (!) 130 kg (286 lb)   SpO2 96%   BMI 41.04 kg/m²   Body mass index is 41.04 kg/m².  Wt Readings from Last 4 Encounters:   11/06/23 (!) 130 kg (286 lb)   09/12/23 (!) 129 kg (283 lb 12.8 oz)   11/01/22 (!) 133 kg (293 lb)   06/15/22 (!) 129 kg (284 lb 6.3 oz)       Physical Exam:  Constitutional: Well-developed and well-nourished. Not diaphoretic. No distress.   Skin: Skin is warm and dry. No rash noted.  Head: Atraumatic without lesions.  Eyes: Conjunctivae and extraocular motions are normal. Pupils are equal, round, and reactive to light. No scleral icterus.   Ears:  External ears unremarkable. Tympanic membranes clear and intact.  Nose: Nares patent. Septum midline. Turbinates without erythema nor edema. No discharge.   Mouth/Throat: Dentition is WNL. Tongue normal. Oropharynx is clear and moist. Posterior pharynx without erythema or exudates.  Neck: Supple, trachea midline. Normal range of motion. No thyromegaly present. No lymphadenopathy--cervical or supraclavicular.  Cardiovascular: Regular " rate and rhythm, S1 and S2 without murmur, rubs, or gallops.    Lungs: Effort normal. Clear to auscultation throughout. No adventitious sounds. No CVA tenderness.  Abdomen: Soft, non tender, and without distention. Active bowel sounds in all four quadrants. No rebound, guarding, masses or HSM.  : deferred  Extremities: No cyanosis, clubbing, erythema, nor edema. Distal pulses intact and symmetric.   Musculoskeletal: All major joints AROM full in all directions without pain.  Neurological: Alert and oriented x 3. DTRs 2+/3 and symmetric. No cranial nerve deficit. 5/5 myotomes. Sensation intact.  Psychiatric:  Behavior, mood, and affect are appropriate.        Assessment and Plan:     1. Wellness examination  CBC WITH DIFFERENTIAL    Comp Metabolic Panel    Lipid Profile      2. Chronic nasal congestion  Referral to ENT          HCM:   Labs per orders.  Vaccinations per orders.  Counseling about diet, supplements, exercise, skin care and safe sex.    Referral for genetic research was offered. Patient declines.    Follow-up: Return in about 1 year (around 11/6/2024), or if symptoms worsen or fail to improve.

## 2024-03-06 ENCOUNTER — TELEPHONE (OUTPATIENT)
Dept: HEALTH INFORMATION MANAGEMENT | Facility: OTHER | Age: 24
End: 2024-03-06
Payer: COMMERCIAL

## 2024-10-25 SDOH — ECONOMIC STABILITY: FOOD INSECURITY: WITHIN THE PAST 12 MONTHS, YOU WORRIED THAT YOUR FOOD WOULD RUN OUT BEFORE YOU GOT MONEY TO BUY MORE.: NEVER TRUE

## 2024-10-25 SDOH — ECONOMIC STABILITY: INCOME INSECURITY: HOW HARD IS IT FOR YOU TO PAY FOR THE VERY BASICS LIKE FOOD, HOUSING, MEDICAL CARE, AND HEATING?: NOT HARD AT ALL

## 2024-10-25 SDOH — HEALTH STABILITY: MENTAL HEALTH
STRESS IS WHEN SOMEONE FEELS TENSE, NERVOUS, ANXIOUS, OR CAN'T SLEEP AT NIGHT BECAUSE THEIR MIND IS TROUBLED. HOW STRESSED ARE YOU?: NOT AT ALL

## 2024-10-25 SDOH — ECONOMIC STABILITY: FOOD INSECURITY: WITHIN THE PAST 12 MONTHS, THE FOOD YOU BOUGHT JUST DIDN'T LAST AND YOU DIDN'T HAVE MONEY TO GET MORE.: NEVER TRUE

## 2024-10-25 SDOH — HEALTH STABILITY: PHYSICAL HEALTH: ON AVERAGE, HOW MANY MINUTES DO YOU ENGAGE IN EXERCISE AT THIS LEVEL?: 120 MIN

## 2024-10-25 SDOH — HEALTH STABILITY: PHYSICAL HEALTH: ON AVERAGE, HOW MANY DAYS PER WEEK DO YOU ENGAGE IN MODERATE TO STRENUOUS EXERCISE (LIKE A BRISK WALK)?: 5 DAYS

## 2024-10-25 SDOH — ECONOMIC STABILITY: INCOME INSECURITY: IN THE LAST 12 MONTHS, WAS THERE A TIME WHEN YOU WERE NOT ABLE TO PAY THE MORTGAGE OR RENT ON TIME?: NO

## 2024-10-25 ASSESSMENT — LIFESTYLE VARIABLES
HOW OFTEN DO YOU HAVE SIX OR MORE DRINKS ON ONE OCCASION: LESS THAN MONTHLY
AUDIT-C TOTAL SCORE: 3
SKIP TO QUESTIONS 9-10: 0
AUDIT-C TOTAL SCORE: 3
HOW OFTEN DO YOU HAVE SIX OR MORE DRINKS ON ONE OCCASION: LESS THAN MONTHLY
HOW MANY STANDARD DRINKS CONTAINING ALCOHOL DO YOU HAVE ON A TYPICAL DAY: 3 OR 4
SKIP TO QUESTIONS 9-10: 0
SKIP TO QUESTIONS 9-10: 0
HOW OFTEN DO YOU HAVE A DRINK CONTAINING ALCOHOL: MONTHLY OR LESS
HOW OFTEN DO YOU HAVE A DRINK CONTAINING ALCOHOL: MONTHLY OR LESS
HOW MANY STANDARD DRINKS CONTAINING ALCOHOL DO YOU HAVE ON A TYPICAL DAY: 3 OR 4
HOW MANY STANDARD DRINKS CONTAINING ALCOHOL DO YOU HAVE ON A TYPICAL DAY: 3 OR 4
HOW OFTEN DO YOU HAVE SIX OR MORE DRINKS ON ONE OCCASION: LESS THAN MONTHLY
HOW OFTEN DO YOU HAVE A DRINK CONTAINING ALCOHOL: MONTHLY OR LESS
AUDIT-C TOTAL SCORE: 3

## 2024-10-25 ASSESSMENT — SOCIAL DETERMINANTS OF HEALTH (SDOH)
IN THE PAST 12 MONTHS, HAS THE ELECTRIC, GAS, OIL, OR WATER COMPANY THREATENED TO SHUT OFF SERVICE IN YOUR HOME?: NO
DO YOU BELONG TO ANY CLUBS OR ORGANIZATIONS SUCH AS CHURCH GROUPS UNIONS, FRATERNAL OR ATHLETIC GROUPS, OR SCHOOL GROUPS?: NO
HOW MANY DRINKS CONTAINING ALCOHOL DO YOU HAVE ON A TYPICAL DAY WHEN YOU ARE DRINKING: 3 OR 4
WITHIN THE PAST 12 MONTHS, YOU WORRIED THAT YOUR FOOD WOULD RUN OUT BEFORE YOU GOT THE MONEY TO BUY MORE: NEVER TRUE
DO YOU BELONG TO ANY CLUBS OR ORGANIZATIONS SUCH AS CHURCH GROUPS UNIONS, FRATERNAL OR ATHLETIC GROUPS, OR SCHOOL GROUPS?: NO
DO YOU BELONG TO ANY CLUBS OR ORGANIZATIONS SUCH AS CHURCH GROUPS UNIONS, FRATERNAL OR ATHLETIC GROUPS, OR SCHOOL GROUPS?: NO
IN THE PAST 12 MONTHS, HAS THE ELECTRIC, GAS, OIL, OR WATER COMPANY THREATENED TO SHUT OFF SERVICE IN YOUR HOME?: NO
IN A TYPICAL WEEK, HOW MANY TIMES DO YOU TALK ON THE PHONE WITH FAMILY, FRIENDS, OR NEIGHBORS?: TWICE A WEEK
IN A TYPICAL WEEK, HOW MANY TIMES DO YOU TALK ON THE PHONE WITH FAMILY, FRIENDS, OR NEIGHBORS?: TWICE A WEEK
HOW OFTEN DO YOU GET TOGETHER WITH FRIENDS OR RELATIVES?: ONCE A WEEK
HOW OFTEN DO YOU HAVE A DRINK CONTAINING ALCOHOL: MONTHLY OR LESS
HOW OFTEN DO YOU ATTEND CHURCH OR RELIGIOUS SERVICES?: NEVER
HOW OFTEN DO YOU ATTEND CHURCH OR RELIGIOUS SERVICES?: NEVER
HOW OFTEN DO YOU HAVE SIX OR MORE DRINKS ON ONE OCCASION: LESS THAN MONTHLY
ARE YOU MARRIED, WIDOWED, DIVORCED, SEPARATED, NEVER MARRIED, OR LIVING WITH A PARTNER?: LIVING WITH PARTNER
HOW OFTEN DO YOU ATTEND CHURCH OR RELIGIOUS SERVICES?: NEVER
HOW OFTEN DO YOU ATTENT MEETINGS OF THE CLUB OR ORGANIZATION YOU BELONG TO?: NEVER
ARE YOU MARRIED, WIDOWED, DIVORCED, SEPARATED, NEVER MARRIED, OR LIVING WITH A PARTNER?: LIVING WITH PARTNER
HOW OFTEN DO YOU ATTENT MEETINGS OF THE CLUB OR ORGANIZATION YOU BELONG TO?: NEVER
IN THE PAST 12 MONTHS, HAS THE ELECTRIC, GAS, OIL, OR WATER COMPANY THREATENED TO SHUT OFF SERVICE IN YOUR HOME?: NO
HOW OFTEN DO YOU ATTENT MEETINGS OF THE CLUB OR ORGANIZATION YOU BELONG TO?: NEVER
IN A TYPICAL WEEK, HOW MANY TIMES DO YOU TALK ON THE PHONE WITH FAMILY, FRIENDS, OR NEIGHBORS?: TWICE A WEEK
HOW OFTEN DO YOU GET TOGETHER WITH FRIENDS OR RELATIVES?: ONCE A WEEK
HOW OFTEN DO YOU GET TOGETHER WITH FRIENDS OR RELATIVES?: ONCE A WEEK
DO YOU BELONG TO ANY CLUBS OR ORGANIZATIONS SUCH AS CHURCH GROUPS UNIONS, FRATERNAL OR ATHLETIC GROUPS, OR SCHOOL GROUPS?: NO
IN A TYPICAL WEEK, HOW MANY TIMES DO YOU TALK ON THE PHONE WITH FAMILY, FRIENDS, OR NEIGHBORS?: TWICE A WEEK
HOW OFTEN DO YOU ATTENT MEETINGS OF THE CLUB OR ORGANIZATION YOU BELONG TO?: NEVER
HOW HARD IS IT FOR YOU TO PAY FOR THE VERY BASICS LIKE FOOD, HOUSING, MEDICAL CARE, AND HEATING?: NOT HARD AT ALL
ARE YOU MARRIED, WIDOWED, DIVORCED, SEPARATED, NEVER MARRIED, OR LIVING WITH A PARTNER?: LIVING WITH PARTNER
HOW OFTEN DO YOU GET TOGETHER WITH FRIENDS OR RELATIVES?: ONCE A WEEK
HOW OFTEN DO YOU ATTEND CHURCH OR RELIGIOUS SERVICES?: NEVER
ARE YOU MARRIED, WIDOWED, DIVORCED, SEPARATED, NEVER MARRIED, OR LIVING WITH A PARTNER?: LIVING WITH PARTNER

## 2024-10-28 ENCOUNTER — APPOINTMENT (OUTPATIENT)
Dept: MEDICAL GROUP | Facility: PHYSICIAN GROUP | Age: 24
End: 2024-10-28
Payer: COMMERCIAL

## 2024-10-28 ENCOUNTER — OFFICE VISIT (OUTPATIENT)
Dept: MEDICAL GROUP | Facility: PHYSICIAN GROUP | Age: 24
End: 2024-10-28
Payer: COMMERCIAL

## 2024-10-28 VITALS
TEMPERATURE: 97.8 F | HEIGHT: 70 IN | BODY MASS INDEX: 39.37 KG/M2 | HEART RATE: 64 BPM | SYSTOLIC BLOOD PRESSURE: 126 MMHG | DIASTOLIC BLOOD PRESSURE: 84 MMHG | OXYGEN SATURATION: 96 % | WEIGHT: 275 LBS

## 2024-10-28 DIAGNOSIS — R06.83 SNORING: ICD-10-CM

## 2024-10-28 DIAGNOSIS — E66.9 OBESITY (BMI 35.0-39.9 WITHOUT COMORBIDITY): ICD-10-CM

## 2024-10-28 DIAGNOSIS — J30.1 SEASONAL ALLERGIC RHINITIS DUE TO POLLEN: ICD-10-CM

## 2024-10-28 DIAGNOSIS — Z30.09 VASECTOMY EVALUATION: ICD-10-CM

## 2024-10-28 DIAGNOSIS — Z00.00 WELLNESS EXAMINATION: ICD-10-CM

## 2024-10-28 DIAGNOSIS — F43.23 ADJUSTMENT DISORDER WITH MIXED ANXIETY AND DEPRESSED MOOD: ICD-10-CM

## 2024-10-28 PROBLEM — J03.90 TONSILLITIS: Status: RESOLVED | Noted: 2017-09-12 | Resolved: 2024-10-28

## 2024-10-28 PROCEDURE — 3079F DIAST BP 80-89 MM HG: CPT | Performed by: NURSE PRACTITIONER

## 2024-10-28 PROCEDURE — 99213 OFFICE O/P EST LOW 20 MIN: CPT | Performed by: NURSE PRACTITIONER

## 2024-10-28 PROCEDURE — 3074F SYST BP LT 130 MM HG: CPT | Performed by: NURSE PRACTITIONER

## 2024-10-28 SDOH — HEALTH STABILITY: PHYSICAL HEALTH: ON AVERAGE, HOW MANY MINUTES DO YOU ENGAGE IN EXERCISE AT THIS LEVEL?: 120 MIN

## 2024-10-28 SDOH — HEALTH STABILITY: PHYSICAL HEALTH: ON AVERAGE, HOW MANY DAYS PER WEEK DO YOU ENGAGE IN MODERATE TO STRENUOUS EXERCISE (LIKE A BRISK WALK)?: 5 DAYS

## 2024-10-28 ASSESSMENT — SOCIAL DETERMINANTS OF HEALTH (SDOH)
HOW HARD IS IT FOR YOU TO PAY FOR THE VERY BASICS LIKE FOOD, HOUSING, MEDICAL CARE, AND HEATING?: NOT HARD AT ALL
DO YOU BELONG TO ANY CLUBS OR ORGANIZATIONS SUCH AS CHURCH GROUPS UNIONS, FRATERNAL OR ATHLETIC GROUPS, OR SCHOOL GROUPS?: NO
ARE YOU MARRIED, WIDOWED, DIVORCED, SEPARATED, NEVER MARRIED, OR LIVING WITH A PARTNER?: LIVING WITH PARTNER
HOW MANY DRINKS CONTAINING ALCOHOL DO YOU HAVE ON A TYPICAL DAY WHEN YOU ARE DRINKING: 3 OR 4
WITHIN THE PAST 12 MONTHS, YOU WORRIED THAT YOUR FOOD WOULD RUN OUT BEFORE YOU GOT THE MONEY TO BUY MORE: NEVER TRUE
HOW OFTEN DO YOU ATTEND CHURCH OR RELIGIOUS SERVICES?: NEVER
HOW OFTEN DO YOU GET TOGETHER WITH FRIENDS OR RELATIVES?: ONCE A WEEK
IN A TYPICAL WEEK, HOW MANY TIMES DO YOU TALK ON THE PHONE WITH FAMILY, FRIENDS, OR NEIGHBORS?: TWICE A WEEK
HOW OFTEN DO YOU HAVE A DRINK CONTAINING ALCOHOL: MONTHLY OR LESS
HOW OFTEN DO YOU HAVE SIX OR MORE DRINKS ON ONE OCCASION: LESS THAN MONTHLY
HOW OFTEN DO YOU ATTENT MEETINGS OF THE CLUB OR ORGANIZATION YOU BELONG TO?: NEVER

## 2024-10-28 ASSESSMENT — PATIENT HEALTH QUESTIONNAIRE - PHQ9: CLINICAL INTERPRETATION OF PHQ2 SCORE: 0

## 2024-11-01 ENCOUNTER — APPOINTMENT (OUTPATIENT)
Dept: LAB | Facility: MEDICAL CENTER | Age: 24
End: 2024-11-01
Payer: COMMERCIAL

## 2024-11-01 DIAGNOSIS — Z00.00 WELLNESS EXAMINATION: ICD-10-CM

## 2024-11-01 LAB
ALBUMIN SERPL BCP-MCNC: 4.7 G/DL (ref 3.2–4.9)
ALBUMIN/GLOB SERPL: 1.4 G/DL
ALP SERPL-CCNC: 86 U/L (ref 30–99)
ALT SERPL-CCNC: 26 U/L (ref 2–50)
ANION GAP SERPL CALC-SCNC: 14 MMOL/L (ref 7–16)
AST SERPL-CCNC: 23 U/L (ref 12–45)
BASOPHILS # BLD AUTO: 0.8 % (ref 0–1.8)
BASOPHILS # BLD: 0.08 K/UL (ref 0–0.12)
BILIRUB SERPL-MCNC: 0.6 MG/DL (ref 0.1–1.5)
BUN SERPL-MCNC: 16 MG/DL (ref 8–22)
CALCIUM ALBUM COR SERPL-MCNC: 9.1 MG/DL (ref 8.5–10.5)
CALCIUM SERPL-MCNC: 9.7 MG/DL (ref 8.5–10.5)
CHLORIDE SERPL-SCNC: 103 MMOL/L (ref 96–112)
CHOLEST SERPL-MCNC: 195 MG/DL (ref 100–199)
CO2 SERPL-SCNC: 23 MMOL/L (ref 20–33)
CREAT SERPL-MCNC: 1.02 MG/DL (ref 0.5–1.4)
EOSINOPHIL # BLD AUTO: 0.08 K/UL (ref 0–0.51)
EOSINOPHIL NFR BLD: 0.8 % (ref 0–6.9)
ERYTHROCYTE [DISTWIDTH] IN BLOOD BY AUTOMATED COUNT: 39.2 FL (ref 35.9–50)
GFR SERPLBLD CREATININE-BSD FMLA CKD-EPI: 104 ML/MIN/1.73 M 2
GLOBULIN SER CALC-MCNC: 3.3 G/DL (ref 1.9–3.5)
GLUCOSE SERPL-MCNC: 82 MG/DL (ref 65–99)
HCT VFR BLD AUTO: 46.1 % (ref 42–52)
HDLC SERPL-MCNC: 50 MG/DL
HGB BLD-MCNC: 15.1 G/DL (ref 14–18)
IMM GRANULOCYTES # BLD AUTO: 0.12 K/UL (ref 0–0.11)
IMM GRANULOCYTES NFR BLD AUTO: 1.2 % (ref 0–0.9)
LDLC SERPL CALC-MCNC: 128 MG/DL
LYMPHOCYTES # BLD AUTO: 2.99 K/UL (ref 1–4.8)
LYMPHOCYTES NFR BLD: 29.3 % (ref 22–41)
MCH RBC QN AUTO: 28.5 PG (ref 27–33)
MCHC RBC AUTO-ENTMCNC: 32.8 G/DL (ref 32.3–36.5)
MCV RBC AUTO: 87 FL (ref 81.4–97.8)
MONOCYTES # BLD AUTO: 0.73 K/UL (ref 0–0.85)
MONOCYTES NFR BLD AUTO: 7.1 % (ref 0–13.4)
NEUTROPHILS # BLD AUTO: 6.21 K/UL (ref 1.82–7.42)
NEUTROPHILS NFR BLD: 60.8 % (ref 44–72)
NRBC # BLD AUTO: 0 K/UL
NRBC BLD-RTO: 0 /100 WBC (ref 0–0.2)
PLATELET # BLD AUTO: 309 K/UL (ref 164–446)
PMV BLD AUTO: 10.4 FL (ref 9–12.9)
POTASSIUM SERPL-SCNC: 4.2 MMOL/L (ref 3.6–5.5)
PROT SERPL-MCNC: 8 G/DL (ref 6–8.2)
RBC # BLD AUTO: 5.3 M/UL (ref 4.7–6.1)
SODIUM SERPL-SCNC: 140 MMOL/L (ref 135–145)
TRIGL SERPL-MCNC: 85 MG/DL (ref 0–149)
WBC # BLD AUTO: 10.2 K/UL (ref 4.8–10.8)

## 2024-11-01 PROCEDURE — 36415 COLL VENOUS BLD VENIPUNCTURE: CPT

## 2024-11-01 PROCEDURE — 80061 LIPID PANEL: CPT

## 2024-11-01 PROCEDURE — 85025 COMPLETE CBC W/AUTO DIFF WBC: CPT

## 2024-11-01 PROCEDURE — 80053 COMPREHEN METABOLIC PANEL: CPT

## 2024-11-20 ENCOUNTER — OFFICE VISIT (OUTPATIENT)
Dept: UROLOGY | Facility: MEDICAL CENTER | Age: 24
End: 2024-11-20
Payer: COMMERCIAL

## 2024-11-20 DIAGNOSIS — Z30.09 VASECTOMY EVALUATION: ICD-10-CM

## 2024-11-20 PROCEDURE — 99204 OFFICE O/P NEW MOD 45 MIN: CPT | Performed by: STUDENT IN AN ORGANIZED HEALTH CARE EDUCATION/TRAINING PROGRAM

## 2024-11-20 NOTE — PROGRESS NOTES
Subjective  Robin Houser is a 24 y.o. male who presents today for evaluation of vasectomy.    He does not have children. He has considered this at length  and he would like to proceed with vasectomy as a permanent form of contraception. He does not have  history of prior  history or surgeries. He is not on anticoagulation.  He does not have a history of a bleeding disorder. He does not have scrotal/testicular pain.      Family History   Problem Relation Age of Onset    Hyperlipidemia Mother     Hypertension Father     Lung Disease Father         Asthma    Ovarian Cancer Paternal Aunt     Diabetes Neg Hx     Genetic Disorder Neg Hx     Genitourinary () Problems Neg Hx     Heart Disease Neg Hx     Non-contributory Neg Hx        Social History     Socioeconomic History    Marital status: Single     Spouse name: Not on file    Number of children: Not on file    Years of education: Not on file    Highest education level: GED or equivalent   Occupational History    Not on file   Tobacco Use    Smoking status: Never    Smokeless tobacco: Never   Vaping Use    Vaping status: Never Used   Substance and Sexual Activity    Alcohol use: Yes     Comment: Pt states about 3 drinks per week    Drug use: Yes     Frequency: 1.0 times per week     Types: Marijuana, Oral     Comment: Maybe used once every few months.    Sexual activity: Yes     Partners: Female     Birth control/protection: Condom, Pill     Comment: 7th grade - [employer]   Other Topics Concern    Behavioral problems No    Interpersonal relationships Yes    Sad or not enjoying activities No    Suicidal thoughts No    Poor school performance Not Asked    Reading difficulties No    Speech difficulties No    Writing difficulties No    Inadequate sleep No    Excessive TV viewing No    Excessive video game use Yes    Inadequate exercise Yes    Sports related No    Poor diet No    Family concerns for drug/alcohol abuse No    Poor oral hygiene No    Bike safety Yes     Family concerns vehicle safety No   Social History Narrative    Not on file     Social Drivers of Health     Financial Resource Strain: Low Risk  (10/25/2024)    Overall Financial Resource Strain (CARDIA)     Difficulty of Paying Living Expenses: Not hard at all   Food Insecurity: No Food Insecurity (10/25/2024)    Hunger Vital Sign     Worried About Running Out of Food in the Last Year: Never true     Ran Out of Food in the Last Year: Never true   Transportation Needs: No Transportation Needs (10/25/2024)    PRAPARE - Transportation     Lack of Transportation (Medical): No     Lack of Transportation (Non-Medical): No   Physical Activity: Sufficiently Active (10/25/2024)    Exercise Vital Sign     Days of Exercise per Week: 5 days     Minutes of Exercise per Session: 120 min   Stress: No Stress Concern Present (10/25/2024)    [place] [employer] - Occupational Stress Questionnaire     Feeling of Stress : Not at all   Social Connections: Moderately Isolated (10/25/2024)    Social Connection and Isolation Panel [NHANES]     Frequency of Communication with Friends and Family: Twice a week     Frequency of Social Gatherings with Friends and Family: Once a week     Attends Mandaeism Services: Never     Active Member of Clubs or Organizations: No     Attends Club or Organization Meetings: Never     Marital Status: Living with partner   Intimate Partner Violence: Not on file   Housing Stability: Low Risk  (10/25/2024)    Housing Stability Vital Sign     Unable to Pay for Housing in the Last Year: No     Number of Times Moved in the Last Year: 0     Homeless in the Last Year: No       History reviewed. No pertinent surgical history.    Past Medical History:   Diagnosis Date    Allergy     dust, perfume, pollen       No current outpatient medications on file.     No current facility-administered medications for this visit.       No Known Allergies    Objective  There were no vitals taken for this  visit.  Physical Exam  Constitutional:       Appearance: Normal appearance.   HENT:      Head: Normocephalic and atraumatic.   Eyes:      Extraocular Movements: Extraocular movements intact.      Conjunctiva/sclera: Conjunctivae normal.   Pulmonary:      Effort: No respiratory distress.   Genitourinary:     Comments: Vas easily palpable, testes without lesions or masses  Musculoskeletal:      Cervical back: Normal range of motion.   Skin:     General: Skin is warm and dry.   Neurological:      General: No focal deficit present.      Mental Status: He is alert.   Psychiatric:         Mood and Affect: Mood normal.         Labs:   CBC   Lab Results   Component Value Date/Time    WBC 10.2 11/01/2024 1631    RBC 5.30 11/01/2024 1631    HEMOGLOBIN 15.1 11/01/2024 1631    HEMATOCRIT 46.1 11/01/2024 1631    MCV 87.0 11/01/2024 1631    MCH 28.5 11/01/2024 1631    MCHC 32.8 11/01/2024 1631    RDW 39.2 11/01/2024 1631    MPV 10.4 11/01/2024 1631    LYMPHOCYTES 29.30 11/01/2024 1631    LYMPHS 2.99 11/01/2024 1631    MONOCYTES 7.10 11/01/2024 1631    MONOS 0.73 11/01/2024 1631    EOSINOPHILS 0.80 11/01/2024 1631    EOS 0.08 11/01/2024 1631    BASOPHILS 0.80 11/01/2024 1631    BASO 0.08 11/01/2024 1631    NRBC 0.00 11/01/2024 1631       BMP   Lab Results   Component Value Date/Time    SODIUM 140 11/01/2024 1631    POTASSIUM 4.2 11/01/2024 1631    CHLORIDE 103 11/01/2024 1631    CO2 23 11/01/2024 1631    GLUCOSE 82 11/01/2024 1631    BUN 16 11/01/2024 1631    CREATININE 1.02 11/01/2024 1631    CALCIUM 9.7 11/01/2024 1631           Imaging:     none    Assessment    Vasectomy was discussed in detail including that this is a form a permanent sterilization/contraception, which has potential complications of bleeding, infection, pain, post-vasectomy pain syndrome, and recanalization.  A vasectomy packet was given to the patient and he was given oral and written instructions regarding the need to have a negative post-vasectomy semen  "analysis prior to being declared \"infertile\", and the need for continued alternative forms of birth control until that time.      Plan    Problem List Items Addressed This Visit       Vasectomy evaluation     RTC for vasectomy, patient declined diazepam  "

## 2024-12-11 ENCOUNTER — PROCEDURE VISIT (OUTPATIENT)
Dept: UROLOGY | Facility: MEDICAL CENTER | Age: 24
End: 2024-12-11
Payer: COMMERCIAL

## 2024-12-11 DIAGNOSIS — Z30.09 VASECTOMY EVALUATION: ICD-10-CM

## 2024-12-11 DIAGNOSIS — Z30.2 ENCOUNTER FOR VASECTOMY: ICD-10-CM

## 2024-12-11 PROCEDURE — 55250 REMOVAL OF SPERM DUCT(S): CPT | Performed by: STUDENT IN AN ORGANIZED HEALTH CARE EDUCATION/TRAINING PROGRAM

## 2024-12-11 RX ADMIN — Medication 20 ML: at 14:06

## 2024-12-11 RX ADMIN — Medication 10 ML: at 14:05

## 2024-12-17 ENCOUNTER — PATIENT MESSAGE (OUTPATIENT)
Dept: UROLOGY | Facility: MEDICAL CENTER | Age: 24
End: 2024-12-17
Payer: COMMERCIAL

## 2024-12-17 SDOH — HEALTH STABILITY: PHYSICAL HEALTH: ON AVERAGE, HOW MANY DAYS PER WEEK DO YOU ENGAGE IN MODERATE TO STRENUOUS EXERCISE (LIKE A BRISK WALK)?: 5 DAYS

## 2024-12-17 SDOH — HEALTH STABILITY: PHYSICAL HEALTH: ON AVERAGE, HOW MANY MINUTES DO YOU ENGAGE IN EXERCISE AT THIS LEVEL?: 120 MIN

## 2024-12-17 ASSESSMENT — SOCIAL DETERMINANTS OF HEALTH (SDOH)
WITHIN THE PAST 12 MONTHS, YOU WORRIED THAT YOUR FOOD WOULD RUN OUT BEFORE YOU GOT THE MONEY TO BUY MORE: NEVER TRUE
HOW OFTEN DO YOU ATTEND CHURCH OR RELIGIOUS SERVICES?: NEVER
HOW HARD IS IT FOR YOU TO PAY FOR THE VERY BASICS LIKE FOOD, HOUSING, MEDICAL CARE, AND HEATING?: NOT HARD AT ALL
HOW OFTEN DO YOU HAVE SIX OR MORE DRINKS ON ONE OCCASION: LESS THAN MONTHLY
HOW MANY DRINKS CONTAINING ALCOHOL DO YOU HAVE ON A TYPICAL DAY WHEN YOU ARE DRINKING: 3 OR 4
HOW OFTEN DO YOU ATTENT MEETINGS OF THE CLUB OR ORGANIZATION YOU BELONG TO?: NEVER
HOW OFTEN DO YOU HAVE A DRINK CONTAINING ALCOHOL: MONTHLY OR LESS
IN A TYPICAL WEEK, HOW MANY TIMES DO YOU TALK ON THE PHONE WITH FAMILY, FRIENDS, OR NEIGHBORS?: TWICE A WEEK
HOW OFTEN DO YOU GET TOGETHER WITH FRIENDS OR RELATIVES?: ONCE A WEEK
DO YOU BELONG TO ANY CLUBS OR ORGANIZATIONS SUCH AS CHURCH GROUPS UNIONS, FRATERNAL OR ATHLETIC GROUPS, OR SCHOOL GROUPS?: NO
ARE YOU MARRIED, WIDOWED, DIVORCED, SEPARATED, NEVER MARRIED, OR LIVING WITH A PARTNER?: LIVING WITH PARTNER

## 2025-01-07 ENCOUNTER — APPOINTMENT (OUTPATIENT)
Dept: MEDICAL GROUP | Facility: PHYSICIAN GROUP | Age: 25
End: 2025-01-07
Payer: COMMERCIAL

## 2025-01-11 SDOH — ECONOMIC STABILITY: FOOD INSECURITY: WITHIN THE PAST 12 MONTHS, YOU WORRIED THAT YOUR FOOD WOULD RUN OUT BEFORE YOU GOT MONEY TO BUY MORE.: NEVER TRUE

## 2025-01-11 SDOH — ECONOMIC STABILITY: INCOME INSECURITY: IN THE LAST 12 MONTHS, WAS THERE A TIME WHEN YOU WERE NOT ABLE TO PAY THE MORTGAGE OR RENT ON TIME?: NO

## 2025-01-11 SDOH — HEALTH STABILITY: PHYSICAL HEALTH: ON AVERAGE, HOW MANY MINUTES DO YOU ENGAGE IN EXERCISE AT THIS LEVEL?: 120 MIN

## 2025-01-11 SDOH — ECONOMIC STABILITY: FOOD INSECURITY: WITHIN THE PAST 12 MONTHS, THE FOOD YOU BOUGHT JUST DIDN'T LAST AND YOU DIDN'T HAVE MONEY TO GET MORE.: NEVER TRUE

## 2025-01-11 SDOH — ECONOMIC STABILITY: INCOME INSECURITY: HOW HARD IS IT FOR YOU TO PAY FOR THE VERY BASICS LIKE FOOD, HOUSING, MEDICAL CARE, AND HEATING?: NOT HARD AT ALL

## 2025-01-11 SDOH — HEALTH STABILITY: PHYSICAL HEALTH: ON AVERAGE, HOW MANY DAYS PER WEEK DO YOU ENGAGE IN MODERATE TO STRENUOUS EXERCISE (LIKE A BRISK WALK)?: 5 DAYS

## 2025-01-11 ASSESSMENT — SOCIAL DETERMINANTS OF HEALTH (SDOH)
HOW OFTEN DO YOU ATTENT MEETINGS OF THE CLUB OR ORGANIZATION YOU BELONG TO?: NEVER
HOW OFTEN DO YOU GET TOGETHER WITH FRIENDS OR RELATIVES?: ONCE A WEEK
IN THE PAST 12 MONTHS, HAS THE ELECTRIC, GAS, OIL, OR WATER COMPANY THREATENED TO SHUT OFF SERVICE IN YOUR HOME?: NO
DO YOU BELONG TO ANY CLUBS OR ORGANIZATIONS SUCH AS CHURCH GROUPS UNIONS, FRATERNAL OR ATHLETIC GROUPS, OR SCHOOL GROUPS?: NO
IN A TYPICAL WEEK, HOW MANY TIMES DO YOU TALK ON THE PHONE WITH FAMILY, FRIENDS, OR NEIGHBORS?: TWICE A WEEK
HOW OFTEN DO YOU ATTEND CHURCH OR RELIGIOUS SERVICES?: NEVER
ARE YOU MARRIED, WIDOWED, DIVORCED, SEPARATED, NEVER MARRIED, OR LIVING WITH A PARTNER?: LIVING WITH PARTNER

## 2025-01-11 ASSESSMENT — LIFESTYLE VARIABLES
SKIP TO QUESTIONS 9-10: 0
AUDIT-C TOTAL SCORE: 3
HOW OFTEN DO YOU HAVE SIX OR MORE DRINKS ON ONE OCCASION: LESS THAN MONTHLY
HOW MANY STANDARD DRINKS CONTAINING ALCOHOL DO YOU HAVE ON A TYPICAL DAY: 3 OR 4
HOW OFTEN DO YOU HAVE A DRINK CONTAINING ALCOHOL: MONTHLY OR LESS

## 2025-01-14 ENCOUNTER — APPOINTMENT (OUTPATIENT)
Dept: MEDICAL GROUP | Facility: PHYSICIAN GROUP | Age: 25
End: 2025-01-14
Payer: COMMERCIAL

## 2025-01-14 SDOH — HEALTH STABILITY: PHYSICAL HEALTH: ON AVERAGE, HOW MANY MINUTES DO YOU ENGAGE IN EXERCISE AT THIS LEVEL?: 120 MIN

## 2025-01-14 SDOH — HEALTH STABILITY: PHYSICAL HEALTH: ON AVERAGE, HOW MANY DAYS PER WEEK DO YOU ENGAGE IN MODERATE TO STRENUOUS EXERCISE (LIKE A BRISK WALK)?: 5 DAYS

## 2025-01-14 ASSESSMENT — SOCIAL DETERMINANTS OF HEALTH (SDOH)
HOW HARD IS IT FOR YOU TO PAY FOR THE VERY BASICS LIKE FOOD, HOUSING, MEDICAL CARE, AND HEATING?: NOT HARD AT ALL
HOW OFTEN DO YOU GET TOGETHER WITH FRIENDS OR RELATIVES?: ONCE A WEEK
WITHIN THE PAST 12 MONTHS, YOU WORRIED THAT YOUR FOOD WOULD RUN OUT BEFORE YOU GOT THE MONEY TO BUY MORE: NEVER TRUE
HOW OFTEN DO YOU HAVE SIX OR MORE DRINKS ON ONE OCCASION: LESS THAN MONTHLY
HOW MANY DRINKS CONTAINING ALCOHOL DO YOU HAVE ON A TYPICAL DAY WHEN YOU ARE DRINKING: 3 OR 4
HOW OFTEN DO YOU ATTENT MEETINGS OF THE CLUB OR ORGANIZATION YOU BELONG TO?: NEVER
DO YOU BELONG TO ANY CLUBS OR ORGANIZATIONS SUCH AS CHURCH GROUPS UNIONS, FRATERNAL OR ATHLETIC GROUPS, OR SCHOOL GROUPS?: NO
HOW OFTEN DO YOU ATTEND CHURCH OR RELIGIOUS SERVICES?: NEVER
ARE YOU MARRIED, WIDOWED, DIVORCED, SEPARATED, NEVER MARRIED, OR LIVING WITH A PARTNER?: LIVING WITH PARTNER
HOW OFTEN DO YOU HAVE A DRINK CONTAINING ALCOHOL: MONTHLY OR LESS
IN A TYPICAL WEEK, HOW MANY TIMES DO YOU TALK ON THE PHONE WITH FAMILY, FRIENDS, OR NEIGHBORS?: TWICE A WEEK

## 2025-02-04 SDOH — HEALTH STABILITY: PHYSICAL HEALTH: ON AVERAGE, HOW MANY DAYS PER WEEK DO YOU ENGAGE IN MODERATE TO STRENUOUS EXERCISE (LIKE A BRISK WALK)?: 5 DAYS

## 2025-02-04 SDOH — ECONOMIC STABILITY: INCOME INSECURITY: HOW HARD IS IT FOR YOU TO PAY FOR THE VERY BASICS LIKE FOOD, HOUSING, MEDICAL CARE, AND HEATING?: NOT HARD AT ALL

## 2025-02-04 SDOH — ECONOMIC STABILITY: INCOME INSECURITY: IN THE LAST 12 MONTHS, WAS THERE A TIME WHEN YOU WERE NOT ABLE TO PAY THE MORTGAGE OR RENT ON TIME?: NO

## 2025-02-04 SDOH — ECONOMIC STABILITY: FOOD INSECURITY: WITHIN THE PAST 12 MONTHS, THE FOOD YOU BOUGHT JUST DIDN'T LAST AND YOU DIDN'T HAVE MONEY TO GET MORE.: NEVER TRUE

## 2025-02-04 SDOH — ECONOMIC STABILITY: FOOD INSECURITY: WITHIN THE PAST 12 MONTHS, YOU WORRIED THAT YOUR FOOD WOULD RUN OUT BEFORE YOU GOT MONEY TO BUY MORE.: NEVER TRUE

## 2025-02-04 SDOH — HEALTH STABILITY: PHYSICAL HEALTH: ON AVERAGE, HOW MANY MINUTES DO YOU ENGAGE IN EXERCISE AT THIS LEVEL?: 120 MIN

## 2025-02-04 ASSESSMENT — SOCIAL DETERMINANTS OF HEALTH (SDOH)
HOW MANY DRINKS CONTAINING ALCOHOL DO YOU HAVE ON A TYPICAL DAY WHEN YOU ARE DRINKING: 3 OR 4
DO YOU BELONG TO ANY CLUBS OR ORGANIZATIONS SUCH AS CHURCH GROUPS UNIONS, FRATERNAL OR ATHLETIC GROUPS, OR SCHOOL GROUPS?: NO
IN A TYPICAL WEEK, HOW MANY TIMES DO YOU TALK ON THE PHONE WITH FAMILY, FRIENDS, OR NEIGHBORS?: TWICE A WEEK
HOW OFTEN DO YOU HAVE A DRINK CONTAINING ALCOHOL: MONTHLY OR LESS
HOW OFTEN DO YOU ATTENT MEETINGS OF THE CLUB OR ORGANIZATION YOU BELONG TO?: NEVER
HOW HARD IS IT FOR YOU TO PAY FOR THE VERY BASICS LIKE FOOD, HOUSING, MEDICAL CARE, AND HEATING?: NOT HARD AT ALL
IN THE PAST 12 MONTHS, HAS THE ELECTRIC, GAS, OIL, OR WATER COMPANY THREATENED TO SHUT OFF SERVICE IN YOUR HOME?: NO
IN A TYPICAL WEEK, HOW MANY TIMES DO YOU TALK ON THE PHONE WITH FAMILY, FRIENDS, OR NEIGHBORS?: TWICE A WEEK
WITHIN THE PAST 12 MONTHS, YOU WORRIED THAT YOUR FOOD WOULD RUN OUT BEFORE YOU GOT THE MONEY TO BUY MORE: NEVER TRUE
ARE YOU MARRIED, WIDOWED, DIVORCED, SEPARATED, NEVER MARRIED, OR LIVING WITH A PARTNER?: LIVING WITH PARTNER
HOW OFTEN DO YOU ATTEND CHURCH OR RELIGIOUS SERVICES?: NEVER
DO YOU BELONG TO ANY CLUBS OR ORGANIZATIONS SUCH AS CHURCH GROUPS UNIONS, FRATERNAL OR ATHLETIC GROUPS, OR SCHOOL GROUPS?: NO
HOW OFTEN DO YOU ATTEND CHURCH OR RELIGIOUS SERVICES?: NEVER
ARE YOU MARRIED, WIDOWED, DIVORCED, SEPARATED, NEVER MARRIED, OR LIVING WITH A PARTNER?: LIVING WITH PARTNER
HOW OFTEN DO YOU HAVE SIX OR MORE DRINKS ON ONE OCCASION: LESS THAN MONTHLY
HOW OFTEN DO YOU GET TOGETHER WITH FRIENDS OR RELATIVES?: ONCE A WEEK
HOW OFTEN DO YOU GET TOGETHER WITH FRIENDS OR RELATIVES?: ONCE A WEEK
HOW OFTEN DO YOU ATTENT MEETINGS OF THE CLUB OR ORGANIZATION YOU BELONG TO?: NEVER

## 2025-02-04 ASSESSMENT — LIFESTYLE VARIABLES
HOW MANY STANDARD DRINKS CONTAINING ALCOHOL DO YOU HAVE ON A TYPICAL DAY: 3 OR 4
AUDIT-C TOTAL SCORE: 3
HOW OFTEN DO YOU HAVE A DRINK CONTAINING ALCOHOL: MONTHLY OR LESS
SKIP TO QUESTIONS 9-10: 0
HOW OFTEN DO YOU HAVE SIX OR MORE DRINKS ON ONE OCCASION: LESS THAN MONTHLY

## 2025-02-05 ENCOUNTER — APPOINTMENT (OUTPATIENT)
Dept: MEDICAL GROUP | Facility: PHYSICIAN GROUP | Age: 25
End: 2025-02-05
Payer: COMMERCIAL

## 2025-02-05 VITALS
SYSTOLIC BLOOD PRESSURE: 112 MMHG | WEIGHT: 271.4 LBS | DIASTOLIC BLOOD PRESSURE: 70 MMHG | TEMPERATURE: 97.4 F | OXYGEN SATURATION: 97 % | HEART RATE: 70 BPM | HEIGHT: 70 IN | BODY MASS INDEX: 38.85 KG/M2

## 2025-02-05 DIAGNOSIS — E66.9 OBESITY (BMI 35.0-39.9 WITHOUT COMORBIDITY): ICD-10-CM

## 2025-02-05 PROCEDURE — 3078F DIAST BP <80 MM HG: CPT | Performed by: NURSE PRACTITIONER

## 2025-02-05 PROCEDURE — 3074F SYST BP LT 130 MM HG: CPT | Performed by: NURSE PRACTITIONER

## 2025-02-05 PROCEDURE — 99395 PREV VISIT EST AGE 18-39: CPT | Performed by: NURSE PRACTITIONER

## 2025-02-05 ASSESSMENT — LIFESTYLE VARIABLES
DURING THE PAST 12 MONTHS, ON HOW MANY DAYS DID YOU DRINK MORE THAN A FEW SIPS OF BEER, WINE, OR ANY DRINK CONTAINING ALCOHOL: 6
DURING THE PAST 12 MONTHS, ON HOW MANY DAYS DID YOU USE ANY TOBACCO OR NICOTINE PRODUCTS: 0
DO YOU EVER USE ALCOHOL OR DRUGS TO RELAX, FEEL BETTER ABOUT YOURSELF, OR FIT IN: NO
DURING THE PAST 12 MONTHS, ON HOW MANY DAYS DID YOU USE ANYTHING ELSE TO GET HIGH: 0
HAVE YOU EVER RIDDEN IN A CAR DRIVEN BY SOMEONE WHO WAS HIGH OR HAD BEEN USING ALCOHOL OR DRUGS: NO
DURING THE PAST 12 MONTHS, ON HOW MANY DAYS DID YOU USE ANY MARIJUANA: 0
PART A TOTAL SCORE: 6
DO YOUR FAMILY OR FRIENDS EVER TELL YOU THAT YOU SHOULD CUT DOWN ON YOUR DRINKING OR DRUG USE: NO
DO YOU EVER FORGET THINGS YOU DID WHILE USING ALCOHOL OR DRUGS: NO
HAVE YOU EVER GOTTEN IN TROUBLE WHILE YOU WERE USING ALCOHOL OR DRUGS: NO
DO YOU EVER USE ALCOHOL OR DRUGS WHILE YOU ARE BY YOURSELF ALONE: NO

## 2025-02-05 ASSESSMENT — FIBROSIS 4 INDEX: FIB4 SCORE: 0.36

## 2025-02-05 ASSESSMENT — PATIENT HEALTH QUESTIONNAIRE - PHQ9: CLINICAL INTERPRETATION OF PHQ2 SCORE: 0

## 2025-02-05 NOTE — PROGRESS NOTES
Subjective:     CC:   Chief Complaint   Patient presents with    Annual Exam     Annual and lab follow up       HPI:   Robin Houser is a 24 y.o. male who presents for an annual exam. He is feeling well and has no complaints.    Health Maintenance  Advanced directive: not on file   Multi vitamin:   Cholesterol Screening: complete   Diabetes Screening: complete   AAA Screening: n/a     Anticipatory Guidance  Diet: improved, states cooking at home more, lots of fruits and veggies  Exercise: regular exercise.    Substance Abuse: no concern   Safe in relationship.   Seat belts, bike helmet, gun safety discussed.  Sun protection used.    Cancer screening  Colorectal Cancer Screening: due 44    Prostate Cancer Screening/PSA: n/a     Infectious disease screening/Immunizations  --STI Screening: declines  --Practices safe sex.  --HIV Screening: complete   --Hepatitis C Screening: complete   --Immunizations: declines     He  has a past medical history of Allergy.    He has no past medical history of ASTHMA.  He  has no past surgical history on file.  Family History   Problem Relation Age of Onset    Hyperlipidemia Mother     Hypertension Father     Lung Disease Father         Asthma    Ovarian Cancer Paternal Aunt     Diabetes Neg Hx     Genetic Disorder Neg Hx     Genitourinary () Problems Neg Hx     Heart Disease Neg Hx     Non-contributory Neg Hx      Social History     Tobacco Use    Smoking status: Never    Smokeless tobacco: Never   Vaping Use    Vaping status: Never Used   Substance Use Topics    Alcohol use: Yes     Alcohol/week: 0.6 oz     Types: 1 Standard drinks or equivalent per week     Comment: Pt states about 3 drinks per week    Drug use: Not Currently     Frequency: 1.0 times per week     Types: Marijuana, Oral     Comment: Maybe used once every few months.       Patient Active Problem List    Diagnosis Date Noted    Vasectomy evaluation 11/20/2024    Snoring 09/12/2023    Adjustment disorder with mixed  "anxiety and depressed mood 08/24/2020    Obesity (BMI 35.0-39.9 without comorbidity) 09/12/2017    Seasonal allergic rhinitis due to pollen 04/04/2017       No current outpatient medications on file.     No current facility-administered medications for this visit.    (including changes today)  Allergies: Patient has no known allergies.    Review of Systems   Constitutional: Negative for fever, chills and malaise/fatigue.   HENT: Negative for congestion.    Eyes: Negative for pain.   Respiratory: Negative for cough and shortness of breath.    Cardiovascular: Negative for leg swelling.   Gastrointestinal: Negative for nausea, vomiting, abdominal pain and diarrhea.   Genitourinary: Negative for dysuria and hematuria.   Skin: Negative for rash.   Neurological: Negative for dizziness, focal weakness and headaches.   Endo/Heme/Allergies: Does not bleed easily.   Psychiatric/Behavioral: Negative for depression.  The patient is not nervous/anxious.      Objective:     /70 (BP Location: Left arm, Patient Position: Sitting, BP Cuff Size: Large adult)   Pulse 70   Temp 36.3 °C (97.4 °F) (Temporal)   Ht 1.778 m (5' 10\")   Wt 123 kg (271 lb 6.4 oz)   SpO2 97%   BMI 38.94 kg/m²   Body mass index is 38.94 kg/m².  Wt Readings from Last 4 Encounters:   02/05/25 123 kg (271 lb 6.4 oz)   10/28/24 125 kg (275 lb)   11/06/23 (!) 130 kg (286 lb)   09/12/23 (!) 129 kg (283 lb 12.8 oz)       Physical Exam:  Constitutional: Well-developed and well-nourished. Not diaphoretic. No distress.   Skin: Skin is warm and dry. No rash noted.  Head: Atraumatic without lesions.  Eyes: Conjunctivae and extraocular motions are normal. Pupils are equal, round, and reactive to light. No scleral icterus.   Ears:  External ears unremarkable. Tympanic membranes clear and intact.  Nose: Nares patent. Septum midline. Turbinates without erythema nor edema. No discharge.   Mouth/Throat: Dentition is WNL. Tongue normal. Oropharynx is clear and moist. " Posterior pharynx without erythema or exudates.  Neck: Supple, trachea midline. Normal range of motion. No thyromegaly present. No lymphadenopathy--cervical or supraclavicular.  Cardiovascular: Regular rate and rhythm, S1 and S2 without murmur, rubs, or gallops.    Lungs: Effort normal. Clear to auscultation throughout. No adventitious sounds. No CVA tenderness.  Abdomen: Soft, non tender, and without distention. Active bowel sounds in all four quadrants. No rebound, guarding, masses or HSM.  : Deferred  Extremities: No cyanosis, clubbing, erythema, nor edema. Distal pulses intact and symmetric.   Musculoskeletal: All major joints AROM full in all directions without pain.  Neurological: Alert and oriented x 3. DTRs 2+/3 and symmetric. No cranial nerve deficit. 5/5 myotomes. Sensation intact.  Psychiatric:  Behavior, mood, and affect are appropriate.      Assessment and Plan:     1. Obesity (BMI 35.0-39.9 without comorbidity)  Patient identified as having weight management issue.  Appropriate orders and counseling given.          HCM:  Labs per orders.  Vaccinations per orders.  Counseling about diet, supplements, exercise, skin care and safe sex.      Follow-up: Return in about 1 year (around 2/5/2026), or if symptoms worsen or fail to improve.